# Patient Record
Sex: FEMALE | Race: WHITE | Employment: OTHER | URBAN - METROPOLITAN AREA
[De-identification: names, ages, dates, MRNs, and addresses within clinical notes are randomized per-mention and may not be internally consistent; named-entity substitution may affect disease eponyms.]

---

## 2018-10-11 ENCOUNTER — HOSPITAL ENCOUNTER (EMERGENCY)
Age: 83
Discharge: HOME | End: 2018-10-11
Attending: EMERGENCY MEDICINE
Payer: MEDICARE

## 2018-10-11 VITALS
BODY MASS INDEX: 28.47 KG/M2 | RESPIRATION RATE: 16 BRPM | HEIGHT: 60 IN | SYSTOLIC BLOOD PRESSURE: 190 MMHG | OXYGEN SATURATION: 90 % | HEART RATE: 72 BPM | DIASTOLIC BLOOD PRESSURE: 81 MMHG | TEMPERATURE: 98.4 F | WEIGHT: 145 LBS

## 2018-10-11 DIAGNOSIS — N30.01 ACUTE CYSTITIS WITH HEMATURIA: Primary | ICD-10-CM

## 2018-10-11 LAB
BACTERIA: ABNORMAL /HPF
BILIRUBIN URINE: NEGATIVE
BLOOD, URINE: ABNORMAL
CLARITY: ABNORMAL
COLOR: YELLOW
GLUCOSE URINE: NEGATIVE MG/DL
KETONES, URINE: NEGATIVE MG/DL
LEUKOCYTE ESTERASE, URINE: ABNORMAL
NITRITE, URINE: NEGATIVE
PH UA: 6 (ref 5–9)
PROTEIN UA: ABNORMAL MG/DL
RBC UA: ABNORMAL /HPF (ref 0–2)
SPECIFIC GRAVITY UA: 1.01 (ref 1–1.03)
UROBILINOGEN, URINE: 0.2 E.U./DL
WBC UA: >20 /HPF (ref 0–5)

## 2018-10-11 PROCEDURE — 87088 URINE BACTERIA CULTURE: CPT

## 2018-10-11 PROCEDURE — 99283 EMERGENCY DEPT VISIT LOW MDM: CPT

## 2018-10-11 PROCEDURE — 81001 URINALYSIS AUTO W/SCOPE: CPT

## 2018-10-11 PROCEDURE — 6370000000 HC RX 637 (ALT 250 FOR IP): Performed by: EMERGENCY MEDICINE

## 2018-10-11 RX ORDER — CEFDINIR 300 MG/1
300 CAPSULE ORAL ONCE
Status: COMPLETED | OUTPATIENT
Start: 2018-10-11 | End: 2018-10-11

## 2018-10-11 RX ORDER — CEFDINIR 300 MG/1
300 CAPSULE ORAL DAILY
Qty: 7 CAPSULE | Refills: 0 | Status: SHIPPED | OUTPATIENT
Start: 2018-10-11 | End: 2018-10-18

## 2018-10-11 RX ADMIN — CEFDINIR 300 MG: 300 CAPSULE ORAL at 03:00

## 2018-10-11 NOTE — ED PROVIDER NOTES
No chest wall tenderness, no crepitus. GI:  Abdomen Soft, Non tender, Non distended. No rebound, guarding, or rigidity. No organomegaly/masses. No peritoneal signs. Normal bowel sounds. No CVA tenderness  Musculoskeletal: Moves all extremities x 4. Warm and well perfused, no clubbing, cyanosis, or edema. Integument: skin warm and dry. No rashes. Neurologic: CN 2-12 grossly intact, no meningeal signs, no focal deficits  Psychiatric: Normal Affect, no signs of depression or psychosis      -------------------------------------------------- RESULTS -------------------------------------------------  I have personally reviewed all laboratory and imaging results for this patient. Results are listed below. LABS:  Results for orders placed or performed during the hospital encounter of 10/11/18   Urinalysis   Result Value Ref Range    Color, UA Yellow Straw/Yellow    Clarity, UA SL CLOUDY Clear    Glucose, Ur Negative Negative mg/dL    Bilirubin Urine Negative Negative    Ketones, Urine Negative Negative mg/dL    Specific Gravity, UA 1.010 1.005 - 1.030    Blood, Urine LARGE (A) Negative    pH, UA 6.0 5.0 - 9.0    Protein, UA TRACE Negative mg/dL    Urobilinogen, Urine 0.2 <2.0 E.U./dL    Nitrite, Urine Negative Negative    Leukocyte Esterase, Urine LARGE (A) Negative   Microscopic Urinalysis   Result Value Ref Range    WBC, UA >20 0 - 5 /HPF    RBC, UA 5-10 (A) 0 - 2 /HPF    Bacteria, UA FEW (A) /HPF       RADIOLOGY:  Interpreted by Radiologist.  No orders to display       ------------------------- NURSING NOTES AND VITALS REVIEWED ---------------------------   The nursing notes within the ED encounter and vital signs as below have been reviewed by myself.   BP (!) 175/88   Pulse 82   Temp 98 °F (36.7 °C) (Oral)   Resp 16   Ht 5' (1.524 m)   Wt 145 lb (65.8 kg)   SpO2 98%   BMI 28.32 kg/m²   Oxygen Saturation Interpretation: Normal    The patients available past medical records and past encounters were

## 2018-10-11 NOTE — ED NOTES
Discharge instructions given and reviewed with patient. RX given. Instructed to follow up with Dr Benito Montesinos. Questions and concerns addressed. Pt departed ED ambulatory in no apparent distress. Personal belongings taken.      Masood Crain RN  10/11/18 4321

## 2018-10-12 LAB — URINE CULTURE, ROUTINE: NORMAL

## 2019-06-05 ENCOUNTER — HOSPITAL ENCOUNTER (OUTPATIENT)
Dept: GENERAL RADIOLOGY | Age: 84
Discharge: HOME | End: 2019-06-07
Payer: MEDICARE

## 2019-06-05 ENCOUNTER — HOSPITAL ENCOUNTER (OUTPATIENT)
Age: 84
Discharge: HOME | End: 2019-06-07
Payer: MEDICARE

## 2019-06-05 DIAGNOSIS — M54.5 LOW BACK PAIN, UNSPECIFIED BACK PAIN LATERALITY, UNSPECIFIED CHRONICITY, WITH SCIATICA PRESENCE UNSPECIFIED: ICD-10-CM

## 2019-06-05 PROCEDURE — 72110 X-RAY EXAM L-2 SPINE 4/>VWS: CPT

## 2020-01-15 ENCOUNTER — HOSPITAL ENCOUNTER (OUTPATIENT)
Age: 85
Discharge: HOME | End: 2020-01-17

## 2020-01-15 PROCEDURE — 88342 IMHCHEM/IMCYTCHM 1ST ANTB: CPT

## 2020-01-15 PROCEDURE — 88305 TISSUE EXAM BY PATHOLOGIST: CPT

## 2021-04-26 ENCOUNTER — HOSPITAL ENCOUNTER (OUTPATIENT)
Dept: GENERAL RADIOLOGY | Age: 86
Discharge: HOME | End: 2021-04-28
Payer: MEDICARE

## 2021-04-26 ENCOUNTER — HOSPITAL ENCOUNTER (OUTPATIENT)
Age: 86
Discharge: HOME | End: 2021-04-28
Payer: MEDICARE

## 2021-04-26 DIAGNOSIS — M54.50 LOW BACK PAIN, UNSPECIFIED BACK PAIN LATERALITY, UNSPECIFIED CHRONICITY, UNSPECIFIED WHETHER SCIATICA PRESENT: ICD-10-CM

## 2021-04-26 PROCEDURE — 72110 X-RAY EXAM L-2 SPINE 4/>VWS: CPT

## 2022-03-25 ENCOUNTER — HOSPITAL ENCOUNTER (OUTPATIENT)
Dept: GENERAL RADIOLOGY | Age: 87
Discharge: HOME | End: 2022-03-27
Payer: MEDICARE

## 2022-03-25 ENCOUNTER — HOSPITAL ENCOUNTER (OUTPATIENT)
Age: 87
Discharge: HOME | End: 2022-03-27
Payer: MEDICARE

## 2022-03-25 DIAGNOSIS — S40.011A CONTUSION OF RIGHT SCAPULA, INITIAL ENCOUNTER: ICD-10-CM

## 2022-03-25 PROCEDURE — 73030 X-RAY EXAM OF SHOULDER: CPT

## 2022-05-10 ENCOUNTER — HOSPITAL ENCOUNTER (OUTPATIENT)
Age: 87
Setting detail: OBSERVATION
Discharge: HOME | End: 2022-05-12
Attending: EMERGENCY MEDICINE | Admitting: INTERNAL MEDICINE
Payer: MEDICARE

## 2022-05-10 ENCOUNTER — APPOINTMENT (OUTPATIENT)
Dept: GENERAL RADIOLOGY | Age: 87
End: 2022-05-10
Payer: MEDICARE

## 2022-05-10 ENCOUNTER — APPOINTMENT (OUTPATIENT)
Dept: CT IMAGING | Age: 87
End: 2022-05-10
Payer: MEDICARE

## 2022-05-10 DIAGNOSIS — E87.6 HYPOKALEMIA: ICD-10-CM

## 2022-05-10 DIAGNOSIS — R55 SYNCOPE AND COLLAPSE: Primary | ICD-10-CM

## 2022-05-10 DIAGNOSIS — E87.1 HYPONATREMIA: ICD-10-CM

## 2022-05-10 DIAGNOSIS — K57.90 DIVERTICULOSIS: ICD-10-CM

## 2022-05-10 PROBLEM — E78.5 HLD (HYPERLIPIDEMIA): Status: ACTIVE | Noted: 2022-05-10

## 2022-05-10 LAB
ALBUMIN SERPL-MCNC: 4.1 G/DL (ref 3.5–5.2)
ALP BLD-CCNC: 81 U/L (ref 35–104)
ALT SERPL-CCNC: 16 U/L (ref 0–32)
ANION GAP SERPL CALCULATED.3IONS-SCNC: 10 MMOL/L (ref 7–16)
AST SERPL-CCNC: 23 U/L (ref 0–31)
BACTERIA: ABNORMAL /HPF
BASOPHILS ABSOLUTE: 0.02 E9/L (ref 0–0.2)
BASOPHILS RELATIVE PERCENT: 0.3 % (ref 0–2)
BILIRUB SERPL-MCNC: 0.6 MG/DL (ref 0–1.2)
BILIRUBIN URINE: ABNORMAL
BLOOD, URINE: ABNORMAL
BUN BLDV-MCNC: 9 MG/DL (ref 6–23)
CALCIUM SERPL-MCNC: 9 MG/DL (ref 8.6–10.2)
CHLORIDE BLD-SCNC: 88 MMOL/L (ref 98–107)
CLARITY: CLEAR
CO2: 29 MMOL/L (ref 22–29)
COLOR: YELLOW
CREAT SERPL-MCNC: 0.6 MG/DL (ref 0.5–1)
EOSINOPHILS ABSOLUTE: 0.04 E9/L (ref 0.05–0.5)
EOSINOPHILS RELATIVE PERCENT: 0.6 % (ref 0–6)
EPITHELIAL CELLS, UA: ABNORMAL /HPF
GFR AFRICAN AMERICAN: >60
GFR NON-AFRICAN AMERICAN: >60 ML/MIN/1.73
GLUCOSE BLD-MCNC: 108 MG/DL (ref 74–99)
GLUCOSE URINE: NEGATIVE MG/DL
HCT VFR BLD CALC: 39.8 % (ref 34–48)
HEMOGLOBIN: 13.7 G/DL (ref 11.5–15.5)
IMMATURE GRANULOCYTES #: 0.03 E9/L
IMMATURE GRANULOCYTES %: 0.4 % (ref 0–5)
INFLUENZA A BY PCR: NOT DETECTED
INFLUENZA B BY PCR: NOT DETECTED
KETONES, URINE: 40 MG/DL
LACTIC ACID: 0.9 MMOL/L (ref 0.5–2.2)
LEUKOCYTE ESTERASE, URINE: ABNORMAL
LYMPHOCYTES ABSOLUTE: 0.99 E9/L (ref 1.5–4)
LYMPHOCYTES RELATIVE PERCENT: 14.2 % (ref 20–42)
MAGNESIUM: 1.9 MG/DL (ref 1.6–2.6)
MCH RBC QN AUTO: 30.1 PG (ref 26–35)
MCHC RBC AUTO-ENTMCNC: 34.4 % (ref 32–34.5)
MCV RBC AUTO: 87.5 FL (ref 80–99.9)
MONOCYTES ABSOLUTE: 0.68 E9/L (ref 0.1–0.95)
MONOCYTES RELATIVE PERCENT: 9.8 % (ref 2–12)
NEUTROPHILS ABSOLUTE: 5.2 E9/L (ref 1.8–7.3)
NEUTROPHILS RELATIVE PERCENT: 74.7 % (ref 43–80)
NITRITE, URINE: NEGATIVE
PDW BLD-RTO: 12.7 FL (ref 11.5–15)
PH UA: 6.5 (ref 5–9)
PLATELET # BLD: 211 E9/L (ref 130–450)
PMV BLD AUTO: 9.1 FL (ref 7–12)
POTASSIUM REFLEX MAGNESIUM: 3.1 MMOL/L (ref 3.5–5)
PRO-BNP: 321 PG/ML (ref 0–450)
PROTEIN UA: NEGATIVE MG/DL
RBC # BLD: 4.55 E12/L (ref 3.5–5.5)
RBC UA: ABNORMAL /HPF (ref 0–2)
SARS-COV-2, NAAT: NOT DETECTED
SODIUM BLD-SCNC: 127 MMOL/L (ref 132–146)
SPECIFIC GRAVITY UA: 1.01 (ref 1–1.03)
TOTAL PROTEIN: 6.9 G/DL (ref 6.4–8.3)
TROPONIN, HIGH SENSITIVITY: 10 NG/L (ref 0–9)
TROPONIN, HIGH SENSITIVITY: 10 NG/L (ref 0–9)
TROPONIN, HIGH SENSITIVITY: 8 NG/L (ref 0–9)
UROBILINOGEN, URINE: 0.2 E.U./DL
WBC # BLD: 7 E9/L (ref 4.5–11.5)
WBC UA: ABNORMAL /HPF (ref 0–5)

## 2022-05-10 PROCEDURE — 87088 URINE BACTERIA CULTURE: CPT

## 2022-05-10 PROCEDURE — 96360 HYDRATION IV INFUSION INIT: CPT

## 2022-05-10 PROCEDURE — 87635 SARS-COV-2 COVID-19 AMP PRB: CPT

## 2022-05-10 PROCEDURE — 6360000004 HC RX CONTRAST MEDICATION: Performed by: RADIOLOGY

## 2022-05-10 PROCEDURE — 96361 HYDRATE IV INFUSION ADD-ON: CPT

## 2022-05-10 PROCEDURE — 6360000002 HC RX W HCPCS: Performed by: NURSE PRACTITIONER

## 2022-05-10 PROCEDURE — 83880 ASSAY OF NATRIURETIC PEPTIDE: CPT

## 2022-05-10 PROCEDURE — 36415 COLL VENOUS BLD VENIPUNCTURE: CPT

## 2022-05-10 PROCEDURE — 2580000003 HC RX 258: Performed by: RADIOLOGY

## 2022-05-10 PROCEDURE — 6370000000 HC RX 637 (ALT 250 FOR IP): Performed by: EMERGENCY MEDICINE

## 2022-05-10 PROCEDURE — G0378 HOSPITAL OBSERVATION PER HR: HCPCS

## 2022-05-10 PROCEDURE — 2580000003 HC RX 258: Performed by: NURSE PRACTITIONER

## 2022-05-10 PROCEDURE — 71045 X-RAY EXAM CHEST 1 VIEW: CPT

## 2022-05-10 PROCEDURE — 99285 EMERGENCY DEPT VISIT HI MDM: CPT

## 2022-05-10 PROCEDURE — 96372 THER/PROPH/DIAG INJ SC/IM: CPT

## 2022-05-10 PROCEDURE — 83605 ASSAY OF LACTIC ACID: CPT

## 2022-05-10 PROCEDURE — 81001 URINALYSIS AUTO W/SCOPE: CPT

## 2022-05-10 PROCEDURE — 80053 COMPREHEN METABOLIC PANEL: CPT

## 2022-05-10 PROCEDURE — 74177 CT ABD & PELVIS W/CONTRAST: CPT

## 2022-05-10 PROCEDURE — 84484 ASSAY OF TROPONIN QUANT: CPT

## 2022-05-10 PROCEDURE — 70450 CT HEAD/BRAIN W/O DYE: CPT

## 2022-05-10 PROCEDURE — 83735 ASSAY OF MAGNESIUM: CPT

## 2022-05-10 PROCEDURE — 85025 COMPLETE CBC W/AUTO DIFF WBC: CPT

## 2022-05-10 PROCEDURE — 93005 ELECTROCARDIOGRAM TRACING: CPT | Performed by: EMERGENCY MEDICINE

## 2022-05-10 PROCEDURE — 87502 INFLUENZA DNA AMP PROBE: CPT

## 2022-05-10 RX ORDER — SODIUM CHLORIDE 0.9 % (FLUSH) 0.9 %
5-40 SYRINGE (ML) INJECTION PRN
Status: DISCONTINUED | OUTPATIENT
Start: 2022-05-10 | End: 2022-05-12 | Stop reason: HOSPADM

## 2022-05-10 RX ORDER — ACETAMINOPHEN 325 MG/1
650 TABLET ORAL EVERY 6 HOURS PRN
Status: DISCONTINUED | OUTPATIENT
Start: 2022-05-10 | End: 2022-05-12 | Stop reason: HOSPADM

## 2022-05-10 RX ORDER — LOSARTAN POTASSIUM 50 MG/1
50 TABLET ORAL NIGHTLY
COMMUNITY
Start: 2022-03-01

## 2022-05-10 RX ORDER — ENOXAPARIN SODIUM 100 MG/ML
40 INJECTION SUBCUTANEOUS DAILY
Status: DISCONTINUED | OUTPATIENT
Start: 2022-05-10 | End: 2022-05-12 | Stop reason: HOSPADM

## 2022-05-10 RX ORDER — SODIUM CHLORIDE 0.9 % (FLUSH) 0.9 %
5-40 SYRINGE (ML) INJECTION EVERY 12 HOURS SCHEDULED
Status: DISCONTINUED | OUTPATIENT
Start: 2022-05-10 | End: 2022-05-12 | Stop reason: HOSPADM

## 2022-05-10 RX ORDER — SODIUM CHLORIDE 0.9 % (FLUSH) 0.9 %
10 SYRINGE (ML) INJECTION PRN
Status: COMPLETED | OUTPATIENT
Start: 2022-05-10 | End: 2022-05-10

## 2022-05-10 RX ORDER — SODIUM CHLORIDE 9 MG/ML
INJECTION, SOLUTION INTRAVENOUS CONTINUOUS
Status: DISCONTINUED | OUTPATIENT
Start: 2022-05-10 | End: 2022-05-12 | Stop reason: HOSPADM

## 2022-05-10 RX ORDER — POTASSIUM CHLORIDE 750 MG/1
10 TABLET, EXTENDED RELEASE ORAL DAILY
Status: DISCONTINUED | OUTPATIENT
Start: 2022-05-11 | End: 2022-05-12 | Stop reason: HOSPADM

## 2022-05-10 RX ORDER — PANTOPRAZOLE SODIUM 40 MG/1
20 TABLET, DELAYED RELEASE ORAL DAILY
COMMUNITY
Start: 2022-04-11

## 2022-05-10 RX ORDER — SIMVASTATIN 40 MG
40 TABLET ORAL NIGHTLY
COMMUNITY
Start: 2022-04-11

## 2022-05-10 RX ORDER — SODIUM CHLORIDE 9 MG/ML
INJECTION, SOLUTION INTRAVENOUS PRN
Status: DISCONTINUED | OUTPATIENT
Start: 2022-05-10 | End: 2022-05-12 | Stop reason: HOSPADM

## 2022-05-10 RX ORDER — ACETAMINOPHEN 650 MG/1
650 SUPPOSITORY RECTAL EVERY 6 HOURS PRN
Status: DISCONTINUED | OUTPATIENT
Start: 2022-05-10 | End: 2022-05-12 | Stop reason: HOSPADM

## 2022-05-10 RX ADMIN — ENOXAPARIN SODIUM 40 MG: 100 INJECTION SUBCUTANEOUS at 21:18

## 2022-05-10 RX ADMIN — POTASSIUM BICARBONATE 40 MEQ: 782 TABLET, EFFERVESCENT ORAL at 18:30

## 2022-05-10 RX ADMIN — SODIUM CHLORIDE: 9 INJECTION, SOLUTION INTRAVENOUS at 21:18

## 2022-05-10 RX ADMIN — SODIUM CHLORIDE, PRESERVATIVE FREE 10 ML: 5 INJECTION INTRAVENOUS at 16:48

## 2022-05-10 RX ADMIN — IOPAMIDOL 75 ML: 755 INJECTION, SOLUTION INTRAVENOUS at 16:52

## 2022-05-10 ASSESSMENT — ENCOUNTER SYMPTOMS
SINUS PAIN: 0
NAUSEA: 1
EYE PAIN: 0
DIARRHEA: 0
BACK PAIN: 0
SORE THROAT: 0
ABDOMINAL PAIN: 0
SHORTNESS OF BREATH: 0
VOMITING: 0

## 2022-05-10 NOTE — ED PROVIDER NOTES
Chief Complaint   Patient presents with    Loss of Consciousness    Constipation     x 3 days     Fatigue       70-year-old female past medical history of hypertension and hyperlipidemia presents after 3 days of nausea, fatigue and one episode of syncope this morning. On Saturday she had an episode of diarrhea that was normal colored, there was no hematochezia or melena. Since then she has been incredibly nauseous but not vomiting and has not had an appetite, she says that water has been making her more nauseous as well so she has been having poor p.o. intake since Saturday. Denies fevers, chills, cough, shortness of breath. She has not had a bowel movement since Saturday and has never had surgeries on her abdomen. She is not having difficulties with urination and there is no associated numbness, weakness, tingling. She was in her kitchen this morning when she said she felt like there was a sickness coming over her, she became very cold and clammy and the next thing she remembers is waking up on the floor. That never happened to her before. She is never had a heart attack or stroke in the past and is on any blood thinners. Review of Systems   Constitutional: Positive for fatigue. Negative for chills and fever. HENT: Negative for ear pain, sinus pain and sore throat. Eyes: Negative for pain. Respiratory: Negative for shortness of breath. Cardiovascular: Negative for chest pain. Gastrointestinal: Positive for nausea. Negative for abdominal pain, diarrhea and vomiting. Genitourinary: Negative for flank pain and pelvic pain. Musculoskeletal: Negative for back pain and neck pain. Skin: Negative for rash. Neurological: Positive for syncope. Negative for seizures and headaches. Hematological: Negative for adenopathy. All other systems reviewed and are negative. Physical Exam  Vitals and nursing note reviewed.    Constitutional:       General: She is not in acute distress. Appearance: She is well-developed. She is not toxic-appearing. HENT:      Head: Normocephalic and atraumatic. Right Ear: External ear normal.      Left Ear: External ear normal.      Nose: Nose normal.      Mouth/Throat:      Mouth: Mucous membranes are moist.      Pharynx: Oropharynx is clear. Eyes:      Pupils: Pupils are equal, round, and reactive to light. Cardiovascular:      Rate and Rhythm: Normal rate and regular rhythm. Heart sounds: Murmur heard. Comments: Systolic murmur  Pulmonary:      Effort: Pulmonary effort is normal. No respiratory distress. Breath sounds: Normal breath sounds. No wheezing. Abdominal:      General: Bowel sounds are normal.      Palpations: Abdomen is soft. Tenderness: There is no abdominal tenderness. There is no guarding or rebound. Musculoskeletal:         General: No swelling. Cervical back: Normal range of motion and neck supple. Right lower leg: No edema. Left lower leg: No edema. Skin:     General: Skin is warm and dry. Neurological:      Mental Status: She is alert and oriented to person, place, and time. Cranial Nerves: No cranial nerve deficit. Motor: No weakness. Procedures     EKG: This EKG is signed and interpreted by me. Rate: 66  Rhythm: Sinus  Interpretation: no acute changes, no ST elevations, intervals normal,   Comparison: no previous EKG      MDM  Number of Diagnoses or Management Options  Diverticulosis  Hypokalemia  Hyponatremia  Syncope and collapse  Diagnosis management comments: 81-year-old female past medical history of hypertension hyperlipidemia plan after 3 days of nausea, fatigue and 1 episode of syncope this morning. She was hypertensive on arrival to emergency department otherwise hemodynamically stable. Dr. Bob Doan called earlier in the day to the patient evaluated for syncope in addition to small bowel obstruction.   EKG was unremarkable with no acute ST elevations or other changes, lab work demonstrated no leukocytosis, anemia, CMP showed hypokalemia and hyponatremia, no evidence of kidney dysfunction. Potassium was repleted. Sodium will be monitored. Chest x-ray and head CT not demonstrate acute process and CT abdomen pelvis showed diverticulosis with questionable mild uncomplicated diverticulitis. Deferring antibiotic management and is not clinically necessary. With her syncopal episode and minimal prior cardiac evaluation, she will need to be admitted for further cardiac work-up. Spoke with April, admitting under Dr. France Stanley, they will accept the patient for further evaluation. Patient verbalized understanding of the plan. ED Course as of 05/10/22 2030   Tue May 10, 2022   1546 Discussed results of labs and imaging with patient and she verbalizes understanding, and as well as plan for admission for further cardiac evaluation due to her syncopal episode. Replating potassium. [MM]   1852 Spoke with April and they will accept the patient for further management under Dr. France Stanley. [MM]      ED Course User Index  [MM] Ruth Pelletier DO        ED Course as of 05/10/22 2030   Tue May 10, 2022   8375 Discussed results of labs and imaging with patient and she verbalizes understanding, and as well as plan for admission for further cardiac evaluation due to her syncopal episode. Replating potassium. [MM]   1852 Spoke with April and they will accept the patient for further management under Dr. France Stanley. [MM]      ED Course User Index  [MM] Ruth Pelletier DO       --------------------------------------------- PAST HISTORY ---------------------------------------------  Past Medical History:  has a past medical history of Arthritis, Hyperlipidemia, and Hypertension. Past Surgical History:  has a past surgical history that includes Tonsillectomy. Social History:  reports that she has never smoked. She does not have any smokeless tobacco history on file.  She reports that she does not drink alcohol and does not use drugs. Family History: family history is not on file. The patients home medications have been reviewed. Allergies: Patient has no known allergies.     -------------------------------------------------- RESULTS -------------------------------------------------    LABS:  Results for orders placed or performed during the hospital encounter of 05/10/22   COVID-19, Rapid    Specimen: Nasopharyngeal Swab   Result Value Ref Range    SARS-CoV-2, NAAT Not Detected Not Detected   RAPID INFLUENZA A/B ANTIGENS    Specimen: Nasopharyngeal   Result Value Ref Range    Influenza A by PCR Not Detected Not Detected    Influenza B by PCR Not Detected Not Detected   CBC with Auto Differential   Result Value Ref Range    WBC 7.0 4.5 - 11.5 E9/L    RBC 4.55 3.50 - 5.50 E12/L    Hemoglobin 13.7 11.5 - 15.5 g/dL    Hematocrit 39.8 34.0 - 48.0 %    MCV 87.5 80.0 - 99.9 fL    MCH 30.1 26.0 - 35.0 pg    MCHC 34.4 32.0 - 34.5 %    RDW 12.7 11.5 - 15.0 fL    Platelets 720 637 - 603 E9/L    MPV 9.1 7.0 - 12.0 fL    Neutrophils % 74.7 43.0 - 80.0 %    Immature Granulocytes % 0.4 0.0 - 5.0 %    Lymphocytes % 14.2 (L) 20.0 - 42.0 %    Monocytes % 9.8 2.0 - 12.0 %    Eosinophils % 0.6 0.0 - 6.0 %    Basophils % 0.3 0.0 - 2.0 %    Neutrophils Absolute 5.20 1.80 - 7.30 E9/L    Immature Granulocytes # 0.03 E9/L    Lymphocytes Absolute 0.99 (L) 1.50 - 4.00 E9/L    Monocytes Absolute 0.68 0.10 - 0.95 E9/L    Eosinophils Absolute 0.04 (L) 0.05 - 0.50 E9/L    Basophils Absolute 0.02 0.00 - 0.20 E9/L   Comprehensive Metabolic Panel w/ Reflex to MG   Result Value Ref Range    Sodium 127 (L) 132 - 146 mmol/L    Potassium reflex Magnesium 3.1 (L) 3.5 - 5.0 mmol/L    Chloride 88 (L) 98 - 107 mmol/L    CO2 29 22 - 29 mmol/L    Anion Gap 10 7 - 16 mmol/L    Glucose 108 (H) 74 - 99 mg/dL    BUN 9 6 - 23 mg/dL    CREATININE 0.6 0.5 - 1.0 mg/dL    GFR Non-African American >60 >=60 mL/min/1.73    GFR African American >60     Calcium 9.0 8.6 - 10.2 mg/dL    Total Protein 6.9 6.4 - 8.3 g/dL    Albumin 4.1 3.5 - 5.2 g/dL    Total Bilirubin 0.6 0.0 - 1.2 mg/dL    Alkaline Phosphatase 81 35 - 104 U/L    ALT 16 0 - 32 U/L    AST 23 0 - 31 U/L   Troponin   Result Value Ref Range    Troponin, High Sensitivity 10 (H) 0 - 9 ng/L   Brain Natriuretic Peptide   Result Value Ref Range    Pro- 0 - 450 pg/mL   Urinalysis with Microscopic   Result Value Ref Range    Color, UA Yellow Straw/Yellow    Clarity, UA Clear Clear    Glucose, Ur Negative Negative mg/dL    Bilirubin Urine SMALL (A) Negative    Ketones, Urine 40 (A) Negative mg/dL    Specific Gravity, UA 1.010 1.005 - 1.030    Blood, Urine SMALL (A) Negative    pH, UA 6.5 5.0 - 9.0    Protein, UA Negative Negative mg/dL    Urobilinogen, Urine 0.2 <2.0 E.U./dL    Nitrite, Urine Negative Negative    Leukocyte Esterase, Urine SMALL (A) Negative    WBC, UA 5-10 (A) 0 - 5 /HPF    RBC, UA 2-5 0 - 2 /HPF    Epithelial Cells, UA FEW /HPF    Bacteria, UA NONE SEEN None Seen /HPF   Lactic Acid   Result Value Ref Range    Lactic Acid 0.9 0.5 - 2.2 mmol/L   Magnesium   Result Value Ref Range    Magnesium 1.9 1.6 - 2.6 mg/dL   Troponin   Result Value Ref Range    Troponin, High Sensitivity 8 0 - 9 ng/L   EKG 12 Lead   Result Value Ref Range    Ventricular Rate 66 BPM    Atrial Rate 66 BPM    P-R Interval 180 ms    QRS Duration 84 ms    Q-T Interval 442 ms    QTc Calculation (Bazett) 463 ms    P Axis 89 degrees    R Axis 7 degrees    T Axis 49 degrees       RADIOLOGY:  CT Head WO Contrast   Final Result   No acute intracranial abnormality. CT ABDOMEN PELVIS W IV CONTRAST Additional Contrast? None   Final Result   Diverticulosis of the left hemicolon with mural thickening of the sigmoid   colon likely spasm or mild uncomplicated diverticulitis. There is no bowel   obstruction. Ventral hernia.       Large hiatal hernia      RECOMMENDATIONS:   Unavailable         XR CHEST PORTABLE   Final Result   No acute process. ------------------------- NURSING NOTES AND VITALS REVIEWED ---------------------------  Date / Time Roomed:  5/10/2022 12:57 PM  ED Bed Assignment:  [id number]    The nursing notes within the ED encounter and vital signs as below have been reviewed. Patient Vitals for the past 24 hrs:   BP Temp Temp src Pulse Resp SpO2 Height Weight   05/10/22 1941 (!) 170/91 97.8 °F (36.6 °C) Oral 72 18 98 %     05/10/22 1912 (!) 145/77 98 °F (36.7 °C) Oral 65 15 97 %     05/10/22 1423 (!) 189/74   67 16 99 %     05/10/22 1247 (!) 189/82 97.8 °F (36.6 °C)  64 16 97 % 5' (1.524 m) 137 lb (62.1 kg)       Oxygen Saturation Interpretation: Normal    ------------------------------------------ PROGRESS NOTES ------------------------------------------  Re-evaluation(s):  Time: 1032  Patients symptoms show no change  Repeat physical examination is not changed    Counseling:  I have spoken with the patient and discussed todays results, in addition to providing specific details for the plan of care and counseling regarding the diagnosis and prognosis. Their questions are answered at this time and they are agreeable with the plan of admission.    --------------------------------- ADDITIONAL PROVIDER NOTES ---------------------------------  Consultations:  Time: 1852. Spoke April, admitting for with Dr. Ardella Dom. Discussed case. They will admit the patient. This patient's ED course included: a personal history and physicial examination, re-evaluation prior to disposition, multiple bedside re-evaluations, cardiac monitoring, continuous pulse oximetry and complex medical decision making and emergency management    This patient has remained hemodynamically stable during their ED course. Diagnosis:  1. Syncope and collapse    2. Hypokalemia    3. Hyponatremia    4.  Diverticulosis        Disposition:  Patient's disposition: Admit to telemetry  Patient's condition is stable.            Eddie Morales,   Resident  05/10/22 2030

## 2022-05-11 PROBLEM — R55 SYNCOPE AND COLLAPSE: Status: ACTIVE | Noted: 2022-05-11

## 2022-05-11 LAB
ANION GAP SERPL CALCULATED.3IONS-SCNC: 7 MMOL/L (ref 7–16)
BASOPHILS ABSOLUTE: 0.01 E9/L (ref 0–0.2)
BASOPHILS RELATIVE PERCENT: 0.2 % (ref 0–2)
BUN BLDV-MCNC: 10 MG/DL (ref 6–23)
CALCIUM SERPL-MCNC: 8.6 MG/DL (ref 8.6–10.2)
CHLORIDE BLD-SCNC: 93 MMOL/L (ref 98–107)
CO2: 29 MMOL/L (ref 22–29)
CREAT SERPL-MCNC: 0.6 MG/DL (ref 0.5–1)
EKG ATRIAL RATE: 66 BPM
EKG P AXIS: 89 DEGREES
EKG P-R INTERVAL: 180 MS
EKG Q-T INTERVAL: 442 MS
EKG QRS DURATION: 84 MS
EKG QTC CALCULATION (BAZETT): 463 MS
EKG R AXIS: 7 DEGREES
EKG T AXIS: 49 DEGREES
EKG VENTRICULAR RATE: 66 BPM
EOSINOPHILS ABSOLUTE: 0.06 E9/L (ref 0.05–0.5)
EOSINOPHILS RELATIVE PERCENT: 1.3 % (ref 0–6)
GFR AFRICAN AMERICAN: >60
GFR NON-AFRICAN AMERICAN: >60 ML/MIN/1.73
GLUCOSE BLD-MCNC: 100 MG/DL (ref 74–99)
HCT VFR BLD CALC: 39.3 % (ref 34–48)
HEMOGLOBIN: 13.7 G/DL (ref 11.5–15.5)
IMMATURE GRANULOCYTES #: 0.02 E9/L
IMMATURE GRANULOCYTES %: 0.4 % (ref 0–5)
LV EF: 60 %
LVEF MODALITY: NORMAL
LYMPHOCYTES ABSOLUTE: 1 E9/L (ref 1.5–4)
LYMPHOCYTES RELATIVE PERCENT: 20.9 % (ref 20–42)
MCH RBC QN AUTO: 30.7 PG (ref 26–35)
MCHC RBC AUTO-ENTMCNC: 34.9 % (ref 32–34.5)
MCV RBC AUTO: 88.1 FL (ref 80–99.9)
MONOCYTES ABSOLUTE: 0.63 E9/L (ref 0.1–0.95)
MONOCYTES RELATIVE PERCENT: 13.2 % (ref 2–12)
NEUTROPHILS ABSOLUTE: 3.06 E9/L (ref 1.8–7.3)
NEUTROPHILS RELATIVE PERCENT: 64 % (ref 43–80)
PDW BLD-RTO: 12.9 FL (ref 11.5–15)
PLATELET # BLD: 201 E9/L (ref 130–450)
PMV BLD AUTO: 9.2 FL (ref 7–12)
POTASSIUM REFLEX MAGNESIUM: 3.6 MMOL/L (ref 3.5–5)
RBC # BLD: 4.46 E12/L (ref 3.5–5.5)
SODIUM BLD-SCNC: 129 MMOL/L (ref 132–146)
WBC # BLD: 4.8 E9/L (ref 4.5–11.5)

## 2022-05-11 PROCEDURE — 89055 LEUKOCYTE ASSESSMENT FECAL: CPT

## 2022-05-11 PROCEDURE — 6360000002 HC RX W HCPCS: Performed by: NURSE PRACTITIONER

## 2022-05-11 PROCEDURE — 93306 TTE W/DOPPLER COMPLETE: CPT

## 2022-05-11 PROCEDURE — G0378 HOSPITAL OBSERVATION PER HR: HCPCS

## 2022-05-11 PROCEDURE — 80048 BASIC METABOLIC PNL TOTAL CA: CPT

## 2022-05-11 PROCEDURE — 97165 OT EVAL LOW COMPLEX 30 MIN: CPT

## 2022-05-11 PROCEDURE — 87425 ROTAVIRUS AG IA: CPT

## 2022-05-11 PROCEDURE — 93010 ELECTROCARDIOGRAM REPORT: CPT | Performed by: INTERNAL MEDICINE

## 2022-05-11 PROCEDURE — 96372 THER/PROPH/DIAG INJ SC/IM: CPT

## 2022-05-11 PROCEDURE — 87077 CULTURE AEROBIC IDENTIFY: CPT

## 2022-05-11 PROCEDURE — 6370000000 HC RX 637 (ALT 250 FOR IP): Performed by: NURSE PRACTITIONER

## 2022-05-11 PROCEDURE — 87329 GIARDIA AG IA: CPT

## 2022-05-11 PROCEDURE — 87045 FECES CULTURE AEROBIC BACT: CPT

## 2022-05-11 PROCEDURE — 96361 HYDRATE IV INFUSION ADD-ON: CPT

## 2022-05-11 PROCEDURE — 2580000003 HC RX 258: Performed by: NURSE PRACTITIONER

## 2022-05-11 PROCEDURE — 97161 PT EVAL LOW COMPLEX 20 MIN: CPT

## 2022-05-11 PROCEDURE — 36415 COLL VENOUS BLD VENIPUNCTURE: CPT

## 2022-05-11 PROCEDURE — 85025 COMPLETE CBC W/AUTO DIFF WBC: CPT

## 2022-05-11 RX ORDER — PANTOPRAZOLE SODIUM 20 MG/1
20 TABLET, DELAYED RELEASE ORAL DAILY
Status: DISCONTINUED | OUTPATIENT
Start: 2022-05-11 | End: 2022-05-12 | Stop reason: HOSPADM

## 2022-05-11 RX ORDER — ATORVASTATIN CALCIUM 20 MG/1
20 TABLET, FILM COATED ORAL NIGHTLY
Status: DISCONTINUED | OUTPATIENT
Start: 2022-05-11 | End: 2022-05-12 | Stop reason: HOSPADM

## 2022-05-11 RX ORDER — LOSARTAN POTASSIUM 50 MG/1
50 TABLET ORAL NIGHTLY
Status: DISCONTINUED | OUTPATIENT
Start: 2022-05-11 | End: 2022-05-12 | Stop reason: HOSPADM

## 2022-05-11 RX ADMIN — ATORVASTATIN CALCIUM 20 MG: 20 TABLET, FILM COATED ORAL at 20:03

## 2022-05-11 RX ADMIN — ENOXAPARIN SODIUM 40 MG: 100 INJECTION SUBCUTANEOUS at 20:02

## 2022-05-11 RX ADMIN — LOSARTAN POTASSIUM 50 MG: 50 TABLET, FILM COATED ORAL at 20:03

## 2022-05-11 RX ADMIN — BISACODYL 5 MG: 5 TABLET, COATED ORAL at 07:55

## 2022-05-11 RX ADMIN — PANTOPRAZOLE SODIUM 20 MG: 20 TABLET, DELAYED RELEASE ORAL at 14:37

## 2022-05-11 RX ADMIN — SODIUM CHLORIDE: 9 INJECTION, SOLUTION INTRAVENOUS at 14:38

## 2022-05-11 RX ADMIN — POTASSIUM CHLORIDE 10 MEQ: 750 TABLET, EXTENDED RELEASE ORAL at 07:55

## 2022-05-11 NOTE — PROGRESS NOTES
Physical Therapy  Facility/Department: [address]  Physical Therapy Initial Assessment    Name: Shane Segovia  : 1935  MRN: [id number]  Date of Service: 2022      Patient Diagnosis(es): The primary encounter diagnosis was Syncope and collapse. Diagnoses of Hypokalemia, Hyponatremia, and Diverticulosis were also pertinent to this visit. Past Medical History:  has a past medical history of Arthritis, Hyperlipidemia, and Hypertension. Past Surgical History:  has a past surgical history that includes Tonsillectomy. Referring provider:  Alana Price MD    PT Order:  PT eval and treat     Evaluating PT:  Alyssa Britton PT, DPT PT [id number]    Room #:  [facility]  Diagnosis:  Syncope and collapse [R55]  Hypokalemia [E87.6]  Diverticulosis [K57.90]  Hyponatremia [E87.1]  Syncope, unspecified syncope type [R55]  Precautions:  Fall risk  Equipment Needs:  None. Pt reported owning a cane. SUBJECTIVE:    Pt lives alone in a 1 story home with 1 stairs to enter and 1 rail. Bed and bath is on first floor. Pt ambulated with no device but uses a cane as needed PTA. OBJECTIVE:   Initial Evaluation  Date:  Treatment Short Term/ Long Term   Goals   Was pt agreeable to Eval/treatment? yes     Does pt have pain? None reported     Bed Mobility  Rolling: independent  Supine to sit: independent  Sit to supine: independent  Scooting: independent  independent   Transfers Sit to stand: Supervision   Stand to sit: supervision   Stand pivot: NT  Modified independent   Ambulation    60 feet with SPC CG/SBA   200+ feet with AAD if needed modified independent    Stair negotiation: ascended and descended  NT   4 steps with 1 rail modified independent   ROM BLE:  WFL     Strength BLE:  Grossly 4/5  Grossly 4+/5   Balance Sitting EOB:  independent  Dynamic Standing:  CG/SBA with SPC  Sitting EOB:  independent  Dynamic Standing:  Modified independent with SPC   AM-PAC 6 Clicks 47/45       Pt is A & O x 3.   Pt hard of hearing  Sensation:  Pt denies numbness and tingling to extremities    Patient education  Pt educated on PT objectives during eval and while in the hospital, calling for assistance. Patient response to education:   Pt verbalized understanding Pt demonstrated skill Pt requires further education in this area   Yes    yes     ASSESSMENT:    Conditions Requiring Skilled Therapeutic Intervention:    [x]Decreased strength     []Decreased ROM  [x]Decreased functional mobility  [x]Decreased balance   [x]Decreased endurance   []Decreased posture  []Decreased sensation  []Decreased coordination   []Decreased vision  []Decreased safety awareness   []Increased pain       Comments:  Pt found in bed. No report of dizziness during functional mobility. Pt drifting to the left at one point during ambulation with CGA to correct. At end of eval, pt left in bed with call light in reach and bed alarm on.      Pt's/ family goals   1. None stated    Prognosis is good for reaching above PT goals. Patient and or family understand(s) diagnosis, prognosis, and plan of care.   yes    PHYSICAL THERAPY PLAN OF CARE:    PT POC is established based on physician order and patient diagnosis     Diagnosis:  Syncope and collapse [R55]  Hypokalemia [E87.6]  Diverticulosis [K57.90]  Hyponatremia [E87.1]  Syncope, unspecified syncope type [R55]    Current Treatment Recommendations:     [x] Strengthening to improve independence with functional mobility   [] ROM to improve independence with functional mobility   [x] Balance Training to improve static/dynamic balance and to reduce fall risk  [x] Endurance Training to improve activity tolerance during functional mobility   [x] Transfer Training to improve safety and independence with all functional transfers   [x] Gait Training to improve gait mechanics, endurance and assess need for appropriate assistive device  [x] Stair Training in preparation for safe discharge home and/or into the community [] Positioning to prevent skin breakdown and contractures  [x] Safety and Education Training   [x] Patient/Caregiver Education   [] HEP  [] Other     PT long term treatment goals are located in above grid    Frequency of treatments: 2-5x/week x 1-2 weeks. Time in  1407  Time out  1416    Evaluation Time includes thorough review of current medical information, gathering information on past medical history/social history and prior level of function, completion of standardized testing/informal observation of tasks, assessment of data and education on plan of care and goals.     CPT codes:  [x] Low Complexity PT evaluation 41453  [] Moderate Complexity PT evaluation 07211  [] High Complexity PT evaluation 48379  [] PT Re-evaluation 21085  [] Therapeutic activities 09273 __minutes  [] Therapeutic exercises 01837 __ minutes      Trudy Todd, PT, DPT  PT [id number]

## 2022-05-11 NOTE — CARE COORDINATION
Social Work/Discharge Planning:  Social Work consult noted. Met with patient and her daughter Livier Alegre and completed initial assessment. Explained Social Work role and discussed transition of care/discharge planning. COVID negative 5/10. Patient lives in a one story house with one step to enter. PTA patient uses a cane as needed. Patient has a bedside commode at home. She denies any history with hhc or snf. Patient PCP is Dr. Aaron Green and pharmacy is [facility] on [address]. Discussed option for home health care and she denies any need for hhc at this time. Informed patient to contact her PCP if hhc is needed. Will continue to follow and assist with discharge planning.   Electronically signed by TONJA Rodriguez on 5/11/2022 at 3:56 PM

## 2022-05-11 NOTE — PROGRESS NOTES
Occupational Therapy  OCCUPATIONAL THERAPY INITIAL EVALUATION     Aida Yung [facility], [place]        [facility]:                                                  Patient Name: Henri Telles    MRN: [id number]    : 1935    Room: 5688077-      Evaluating OT: Karl Bragg OTR/L [id number]      Referring Provider: ELEN Wilson CNP    Specific Provider Orders/Date: OT eval and treat 5/10/22      Diagnosis: Syncope and collapse [R55]  Hypokalemia [E87.6]  Diverticulosis [K57.90]  Hyponatremia [E87.1]  Syncope, unspecified syncope type [R55]      Pertinent Medical History: arthritis, HTN      Precautions:  Fall Risk     Assessment of current deficits    [x] Functional mobility  [x]ADLs  [x] Strength               []Cognition    [x] Functional transfers   [x] IADLs         [x] Safety Awareness   [x]Endurance    [] Fine Coordination              [x] Balance      [] Vision/perception   []Sensation     []Gross Motor Coordination  [] ROM  [] Delirium                   [] Motor Control     OT PLAN OF CARE   OT POC based on physician orders, patient diagnosis and results of clinical assessment    Frequency/Duration 2-4 days/wk for 2 weeks PRN   Specific OT Treatment Interventions to include:   * Instruction/training on adapted ADL techniques and AE recommendations to increase functional independence within precautions       * Training on energy conservation strategies, correct breathing pattern and techniques to improve independence/tolerance for self-care routine  * Functional transfer/mobility training/DME recommendations for increased independence, safety, and fall prevention  * Patient/Family education to increase follow through with safety techniques and functional independence  * Recommendation of environmental modifications for increased safety with functional transfers/mobility and ADLs  * Therapeutic exercise to improve with ADLs and functional task completion   Activity Tolerance Fair+ with light activity  Good with ADL activity. Pt will demonstrate good understanding of education provided on EC/WS techniques    Visual/  Perceptual Glasses: at bedside                Additional long-term goal: Pt will increase functional independence to PLOF to allow pt to live in least restrictive environment. Hand Dominance R   AROM (PROM) Strength Additional Info:    RUE  WFL WFL good  and wfl FMC/dexterity noted during ADL tasks and functional bed mobility       LUE WFL WFL good  and wfl FMC/dexterity noted during ADL tasks and functional bed mobility       Hearing: WFL   Sensation:  No c/o numbness or tingling   Tone: WFL   Edema: none noted    Comments: Upon arrival patient lying in bed. At end of session, patient returned to bed with call light and phone within reach, all lines and tubes intact. Overall patient demonstrated decreased independence and safety during completion of ADL/functional transfer/mobility tasks. Pt would benefit from continued skilled OT to increase safety and independence with completion of ADL/IADL tasks for functional independence and quality of life. Rehab Potential: Good for established goals     Patient / Family Goal: none stated    Patient and/or family were instructed on functional diagnosis, prognosis/goals and OT plan of care. Demonstrated good understanding.      Eval Complexity: Low    Time In: 1406  Time Out: 1419    Min Units   OT Eval Low 97165  x  1   OT Eval Medium 20075      OT Eval High 50136      OT Re-Eval [id number]       Therapeutic Ex [id number]       Therapeutic Activities 61955       ADL/Self Care 72242       Orthotic Management 34119       Manual 44827     Neuro Re-Ed 79112       Non-Billable Time          Evaluation Time additionally includes thorough review of current medical information, gathering information on past medical history/social history and prior level of function, interpretation of standardized testing/informal observation of tasks, assessment of data and development of plan of care and goals.             Emily Cabrera, OTR/L, [id number]

## 2022-05-11 NOTE — H&P
[facility]  History and Physical      CHIEF COMPLAINT:    Chief Complaint   Patient presents with    Loss of Consciousness    Constipation     x 3 days     Fatigue        Patient of Nguyen Avendano MD presents with:  Syncope    History of Present Illness:   Patient is an 71-year-old female with a past medical history of arthritis, HLD, HTN who presents to the emergency room after a syncopal episode. Patient states that she was in her kitchen when all of a sudden she felt a sensation of clamminess and loss of her peripheral vision and then next remembering waking up on the floor. Patient also states that she has had constipation for the past 4 days. Patient states recently she had an GI bug which resulted in her not having an appetite in which she did not eat or drink much for the past few days. Patient had CT head and CXR which were negative for anything acute. She did have a CT abdomen which identified a large hiatal hernia and an area of possible mild uncomplicated diverticulitis with no bowel obstruction. Her lab work did identify hyponatremia 127, hypokalemia with 3.4 and hypochloremia of 88. Patient denies any CP, SOB, abdominal pain, fever, chills, N/VD. Patient is admitted to intermediate telemetry unit for further work-up and treatment. REVIEW OF SYSTEMS:  Pertinent negatives are above in HPI. 10 point ROS otherwise negative. Past Medical History:   Diagnosis Date    Arthritis     Hyperlipidemia     Hypertension          Past Surgical History:   Procedure Laterality Date    TONSILLECTOMY         Medications Prior to Admission:    Medications Prior to Admission: losartan (COZAAR) 50 MG tablet, Take 50 mg by mouth at bedtime  pantoprazole (PROTONIX) 40 MG tablet, Take 20 mg by mouth daily  simvastatin (ZOCOR) 40 MG tablet, Take 40 mg by mouth at bedtime  potassium chloride SA (K-DUR;KLOR-CON) 10 MEQ tablet, Take 1 tablet by mouth daily.  (Patient taking differently: Take 20 mEq by mouth daily )  HYDROCHLOROTHIAZIDE, Take 25 mg by mouth daily Unknown dose   [DISCONTINUED] Simvastatin (ZOCOR PO), Take 1 tablet by mouth daily. Unknown dose     Note that the patient's home medications were reviewed and the above list is accurate to the best of my knowledge at the time of the exam.    Allergies:    Patient has no known allergies. Social History:    reports that she has never smoked. She does not have any smokeless tobacco history on file. She reports that she does not drink alcohol and does not use drugs. Family History:   family history is not on file. PHYSICAL EXAM:    Vitals:  BP (!) 171/71   Pulse 63   Temp 97.8 °F (36.6 °C) (Oral)   Resp 16   Ht 5' (1.524 m)   Wt 138 lb 1.6 oz (62.6 kg)   SpO2 98%   BMI 26.97 kg/m²       General appearance: NAD, conversant, pleasant  Eyes: Sclerae anicteric, PERRLA  HEENT: AT/NC, MMM  Neck: FROM, supple, no thyromegaly  Lymph: No cervical / supraclavicular lymphadenopathy  Lungs: Clear to auscultation, WOB normal  CV: RRR, no MRGs, no lower extremity edema  Abdomen: Soft, non-tender; no masses or HSM, +BS  Extremities: FROM without synovitis. No clubbing or cyanosis of the hands. Skin: no rash, induration, lesions, or ulcers  Psych: Calm and cooperative. Normal judgement and insight. Normal mood and affect. Neuro: Alert and interactive, face symmetric, speech fluent. LABS:  All labs reviewed.   Of note:  CBC:   Lab Results   Component Value Date    WBC 4.8 05/11/2022    RBC 4.46 05/11/2022    HGB 13.7 05/11/2022    HCT 39.3 05/11/2022    MCV 88.1 05/11/2022    MCH 30.7 05/11/2022    MCHC 34.9 05/11/2022    RDW 12.9 05/11/2022     05/11/2022    MPV 9.2 05/11/2022     CMP:    Lab Results   Component Value Date     05/11/2022    K 3.6 05/11/2022    CL 93 05/11/2022    CO2 29 05/11/2022    BUN 10 05/11/2022    CREATININE 0.6 05/11/2022    GFRAA >60 05/11/2022    LABGLOM >60 05/11/2022    GLUCOSE 100 05/11/2022 PROT 6.9 05/10/2022    LABALBU 4.1 05/10/2022    CALCIUM 8.6 05/11/2022    BILITOT 0.6 05/10/2022    ALKPHOS 81 05/10/2022    AST 23 05/10/2022    ALT 16 05/10/2022       Imaging:  CXR: Negative  CT head: Negative  CT abdomen pelvis: Large hiatal hernia, ventral hernia, diverticulosis of the left hemicolon with mild thickening of the sigmoid colon likely spasm or mild uncomplicated diverticulitis. EKG:  Sinus rhythm with PACs    Telemetry:  Sinus rhythm with PACs    ASSESSMENT/PLAN:  Principal Problem:    Syncope  Active Problems:    Hypokalemia    Diverticulosis    Hyponatremia    HLD (hyperlipidemia)  Resolved Problems:    * No resolved hospital problems. *    Patient is an 81-year-old female admitted to [facility] for  Syncope and collapse likely related to dehydration  -Monitor labs  -Check orthostatics  -IVF NS at 50  -Check echocardiogram normal left ventricular function    Electrolyte imbalance  -Monitor labs, BMP daily  -K+ 3.1 > 3.6 today after replacement  -Hyponatremia 127 > 129 , continue NS    Hypertension   -Continue home medications    DVT prophylaxis Lovenox. PT OT  Discharge Planning    Code status: Full  Requires inpatient level of care  ELEN Pugh CNP    2:06 PM  5/11/2022     Above note edited to reflect my thoughts   Continue to monitor through tonight, check orthostatics  Discharge plan tomorrow    I personally saw, examined and provided care for the patient. Radiographs, labs and medication list were reviewed by me independently. The case was discussed in detail and plans for care were established. Review of LUKE Pugh   , documentation was conducted and revisions were made as appropriate directly by me. I agree with the above documented exam, problem list, and plan of care.      Matt Luis MD  6:27 PM  5/11/2022

## 2022-05-12 VITALS
DIASTOLIC BLOOD PRESSURE: 69 MMHG | SYSTOLIC BLOOD PRESSURE: 155 MMHG | TEMPERATURE: 98 F | RESPIRATION RATE: 16 BRPM | BODY MASS INDEX: 27.48 KG/M2 | HEIGHT: 60 IN | OXYGEN SATURATION: 97 % | HEART RATE: 67 BPM | WEIGHT: 140 LBS

## 2022-05-12 LAB
ANION GAP SERPL CALCULATED.3IONS-SCNC: 8 MMOL/L (ref 7–16)
BUN BLDV-MCNC: 11 MG/DL (ref 6–23)
CALCIUM SERPL-MCNC: 8.6 MG/DL (ref 8.6–10.2)
CHLORIDE BLD-SCNC: 96 MMOL/L (ref 98–107)
CO2: 28 MMOL/L (ref 22–29)
CREAT SERPL-MCNC: 0.5 MG/DL (ref 0.5–1)
GFR AFRICAN AMERICAN: >60
GFR NON-AFRICAN AMERICAN: >60 ML/MIN/1.73
GIARDIA ANTIGEN STOOL: NORMAL
GLUCOSE BLD-MCNC: 135 MG/DL (ref 74–99)
POTASSIUM SERPL-SCNC: 4.6 MMOL/L (ref 3.5–5)
ROTAVIRUS ANTIGEN: NORMAL
SODIUM BLD-SCNC: 132 MMOL/L (ref 132–146)
WHITE BLOOD CELLS (WBC), STOOL: NORMAL

## 2022-05-12 PROCEDURE — 2580000003 HC RX 258: Performed by: NURSE PRACTITIONER

## 2022-05-12 PROCEDURE — 36415 COLL VENOUS BLD VENIPUNCTURE: CPT

## 2022-05-12 PROCEDURE — 80048 BASIC METABOLIC PNL TOTAL CA: CPT

## 2022-05-12 PROCEDURE — G0378 HOSPITAL OBSERVATION PER HR: HCPCS

## 2022-05-12 PROCEDURE — 6370000000 HC RX 637 (ALT 250 FOR IP): Performed by: NURSE PRACTITIONER

## 2022-05-12 PROCEDURE — 96361 HYDRATE IV INFUSION ADD-ON: CPT

## 2022-05-12 RX ADMIN — POTASSIUM CHLORIDE 10 MEQ: 750 TABLET, EXTENDED RELEASE ORAL at 08:51

## 2022-05-12 RX ADMIN — PANTOPRAZOLE SODIUM 20 MG: 20 TABLET, DELAYED RELEASE ORAL at 08:51

## 2022-05-12 RX ADMIN — SODIUM CHLORIDE, PRESERVATIVE FREE 10 ML: 5 INJECTION INTRAVENOUS at 08:50

## 2022-05-12 RX ADMIN — ACETAMINOPHEN 650 MG: 325 TABLET ORAL at 05:55

## 2022-05-12 ASSESSMENT — PAIN SCALES - GENERAL: PAINLEVEL_OUTOF10: 0

## 2022-05-12 NOTE — PROGRESS NOTES
Pt accidentally removed IV, refusing new IV, states she was told she could leave today and does not need one

## 2022-05-12 NOTE — DISCHARGE SUMMARY
[facility]   Discharge summary   Patient ID:  Erin Pedroza  [id number]  81 y.o.  1935    Admit date: 5/10/2022    Discharge date and time: 5/12/2022    Admission Diagnoses:   Patient Active Problem List   Diagnosis    Syncope    Hypokalemia    Diverticulosis    Hyponatremia    HLD (hyperlipidemia)    Syncope and collapse       Discharge Diagnoses: Syncope and collapse     Consults: none    Procedures: none    Hospital Course:     Patient is an 81-year-old female admitted to [facility] for  Syncope and collapse likely related to dehydration  -Monitor labs  -Check orthostatics  -IVF NS at 50 > discontinued   -Check echocardiogram- normal left ventricular function     Electrolyte imbalance  -Monitor labs, BMP daily  -K+ 3.1 > 3.6 today after replacement  -Hyponatremia 127 > 129 , continue NS > 132 on discharge      Hypertension   -Continue home medications    Recent Labs     05/10/22  1441 05/11/22  0528   WBC 7.0 4.8   HGB 13.7 13.7   HCT 39.8 39.3    201       Recent Labs     05/10/22  1441 05/11/22  0528 05/12/22  1042   * 129* 132   K 3.1* 3.6 4.6   CL 88* 93* 96*   CO2 29 29 28   BUN 9 10 11   CREATININE 0.6 0.6 0.5   CALCIUM 9.0 8.6 8.6       Echo Complete    Result Date: 5/11/2022  Transthoracic Echocardiography Report (TTE)  Demographics   Patient Name    [facility]       Gender            Female                  [facility] Record  [id number]      Room Number       2733  Number   Account #       [de-identified]     Procedure Date    05/11/2022   Corporate ID                  Alex Murrieta MD                                Physician   Accession       [id number]    Referring  Number                        Physician   Date of Birth   1935    Sonographer       RAHAT Ramirez   Age             81 year(s)    Interpreting      [address] [place] [address]                                Physician         Physician Cardiology Raeann Cornell MD                                 Any Other  Procedure Type of Study   TTE procedure:Echo Complete W/Doppler & Color Flow. Procedure Date Date: 05/11/2022 Start: 09:18 AM Study Location: Echo Lab Technical Quality: Adequate visualization Indications:Syncope. Patient Status: Routine Height: 60 inches Weight: 138 pounds BSA: 1.59 m^2 BMI: 26.95 kg/m^2 HR: 63 bpm BP: 147/88 mmHg  Findings   Left Ventricle  Left ventricular internal dimensions were normal in diastole and systole. Mild asymmetric proximal septal hypertrophy. No regional wall motion abnormalities seen. Normal left ventricular ejection fraction. Ejection fraction is visually estimated at 60%. Indeterminate diastolic function. Right Ventricle  Normal right ventricular size and function. Left Atrium  The left atrium is mildly dilated. Interatrial septum appears intact. Right Atrium  Normal right atrium size. Mitral Valve  Focal calcification mitral valve leaflets. Mild mitral annular calcification. Mild mitral regurgitation is present. Tricuspid Valve  The tricuspid valve appears structurally normal.  Physiologic and/or trace tricuspid regurgitation. Aortic Valve  The aortic valve appears mildly sclerotic. Mild aortic regurgitation is noted. Pulmonic Valve  The pulmonic valve was not well visualized. Pericardial Effusion  No evidence of pericardial effusion. Aorta  Aortic root dimension within normal limits. Conclusions   Summary  Left ventricular internal dimensions were normal in diastole and systole. Mild asymmetric proximal septal hypertrophy. No regional wall motion abnormalities seen. Normal left ventricular ejection fraction. The left atrium is mildly dilated. Focal calcification mitral valve leaflets. Mild mitral annular calcification. Mild mitral regurgitation is present. The aortic valve appears mildly sclerotic. Mild aortic regurgitation is noted.   Physiologic and/or trace tricuspid regurgitation. Signature   ----------------------------------------------------------------  Electronically signed by Rocio Watson MD(Interpreting  physician) on 05/11/2022 02:18 PM  ----------------------------------------------------------------  M-Mode/2D Measurements & Calculations   LV Diastolic    LV Systolic Dimension: 2.6   AV Cusp Separation: 1.4 cmLA  Dimension: 3.9  cm                           Dimension: 2.5 cmAO Root  cm              LV Volume Diastolic: 68.8 ml Dimension: 3 cm  LV FS:33.3 %    LV Volume Systolic: 92.1 ml  RVOT VTI: 16.8 cm  LV PW           LV EDV/LV EDV Index: 65.0  Diastolic: 1.1  LM/57 VW/C^6NM ESV/LV ESV  cm              Index: 25.7 ml/16ml/ m^2     RV Diastolic Dimension: 2.6  Septum          EF Calculated: 60.2 %        cm  Diastolic: 1.1  LV Mass Index: 88 l/min*m^2  cm                                           Ascending Aorta: 2.6 cm  CO: 4.39 l/min                               LA volume/Index: 62.9 ml  CI: 2.76        LVOT: 1.9 cm                 /39ml/m^2  l/m*m^2                                      RA Area: 10.2 cm^2  LV Mass: 140.09  g  Doppler Measurements & Calculations   MV Peak E-Wave:   AV Peak Velocity: 1.57 m/s    LVOT Peak Velocity: 1.08  0.82 m/s          AV Peak Gradient: 9.82 mmHg   m/s  MV Peak A-Wave:   AV Mean Velocity: 0.99 m/s    LVOT Mean Velocity: 0.68  1.08 m/s          AV Mean Gradient: 4.6 mmHg    m/s  MV E/A Ratio:     AV VTI: 32.5 cm               LVOT Peak Gradient: 4.7  0.77              AV Area (Continuity):2.15     mmHgLVOT Mean Gradient:  MV Peak Gradient: cm^2                          2.1 mmHg  6.2 mmHg          AV Deceleration Time: 3241.1  Estimated RVSP: 31.8 mmHg  MV Mean Gradient: msec                          Estimated RAP:3 mmHg  2.3 mmHg          LVOT VTI: 24.6 cm  MV Mean Velocity:  0.72 m/s          Estimated PASP: 27.58 mmHg    TR Velocity:2.48 m/s  MV Deceleration   Pulm.  Vein A Reversal         TR Gradient:24.58 mmHg  Time: 228.6 msec  Duration:147.6 msec           PV Peak Velocity: 0.79 m/s  MV P1/2t: 49.7    Pulm. Vein D Velocity:0.35    PV Peak Gradient: 2.52  msec              m/sPulm. Vein A Reversal      mmHg  MVA by PHT:4.43   Velocity:0.3 m/s              PV Mean Velocity: 0.48 m/s  cm^2              Pulm. Vein S Velocity: 0.74   PV Mean Gradient: 1.1 mmHg  MV Area           m/s  (continuity): 2.2  cm^2  MV E' Septal  Velocity: 0.04  m/s  MV E' Lateral  Velocity: 4 m/s  [email or web address] [id number]    CT Head WO Contrast    Result Date: 5/10/2022  EXAMINATION: CT OF THE HEAD WITHOUT CONTRAST  5/10/2022 4:48 pm TECHNIQUE: CT of the head was performed without the administration of intravenous contrast. Automated exposure control, iterative reconstruction, and/or weight based adjustment of the mA/kV was utilized to reduce the radiation dose to as low as reasonably achievable. COMPARISON: None. HISTORY: ORDERING SYSTEM PROVIDED HISTORY: syncope, unwitnessed fall TECHNOLOGIST PROVIDED HISTORY: Reason for exam:->syncope, unwitnessed fall Has a \"code stroke\" or \"stroke alert\" been called? ->No Decision Support Exception - unselect if not a suspected or confirmed emergency medical condition->Emergency Medical Condition (MA) FINDINGS: BRAIN/VENTRICLES: There is no acute intracranial hemorrhage, mass effect or midline shift. No abnormal extra-axial fluid collection. The gray-white differentiation is maintained without evidence of an acute infarct. There is no evidence of hydrocephalus. There is moderate generalized atrophy and chronic ischemic/degenerative changes in the white matter. ORBITS: The visualized portion of the orbits demonstrate no acute abnormality. SINUSES: The visualized paranasal sinuses and mastoid air cells demonstrate no acute abnormality.   There is mucosal thickening in both maxillary sinuses, relatively significant on the right. Pole SOFT TISSUES/SKULL:  No acute abnormality of the visualized skull or soft tissues. No acute intracranial abnormality. CT ABDOMEN PELVIS W IV CONTRAST Additional Contrast? None    Result Date: 5/10/2022  EXAMINATION: CT OF THE ABDOMEN AND PELVIS WITH CONTRAST 5/10/2022 4:48 pm TECHNIQUE: CT of the abdomen and pelvis was performed with the administration of intravenous contrast. Multiplanar reformatted images are provided for review. Automated exposure control, iterative reconstruction, and/or weight based adjustment of the mA/kV was utilized to reduce the radiation dose to as low as reasonably achievable. COMPARISON: None. HISTORY: ORDERING SYSTEM PROVIDED HISTORY: r/o obstruction TECHNOLOGIST PROVIDED HISTORY: Additional Contrast?->None Reason for exam:->r/o obstruction Decision Support Exception - unselect if not a suspected or confirmed emergency medical condition->Emergency Medical Condition (MA) FINDINGS: The lung bases demonstrate a large hiatal hernia. There is coronary artery calcifications. The liver is normal.  Gallbladder is partially distended. Spleen, pancreas, the adrenals and the kidneys are normal.  There is vascular calcification. Multiple compression fractures of the thoracic and lumbar spine are noted. Fat containing umbilical hernia is noted. Stomach is collapsed with the  wall thickening. Pelvis. The bladder is normal.  Small bowel loops are normal.  There is diffuse diverticulosis of the left hemicolon with mural thickening of the sigmoid colon. The appendix is normal.     Diverticulosis of the left hemicolon with mural thickening of the sigmoid colon likely spasm or mild uncomplicated diverticulitis. There is no bowel obstruction. Ventral hernia. Large hiatal hernia RECOMMENDATIONS: Unavailable     XR CHEST PORTABLE    Result Date: 5/10/2022  EXAMINATION: ONE XRAY VIEW OF THE CHEST 5/10/2022 1:22 pm COMPARISON: None.  HISTORY: ORDERING SYSTEM PROVIDED HISTORY: syncope TECHNOLOGIST PROVIDED HISTORY: Reason for exam:->syncope FINDINGS: The lungs are without acute focal process. There is no effusion or pneumothorax. The cardiomediastinal silhouette is without acute process. The osseous structures are without acute process. There is a retrocardiac density with an air-fluid level, suggests a fixed hiatus hernia. No acute process. Discharge Exam:    HEENT: NCAT,  PERRLA, No JVD  Heart:  RRR, no murmurs, gallops, or rubs. Lungs:  CTA bilaterally, no wheeze, rales or rhonchi  Abd: bowel sounds present, nontender, nondistended, no masses  Extrem:  No clubbing, cyanosis, or edema    Disposition: home     Patient Condition at Discharge: Stable    Patient Instructions:      Medication List        CHANGE how you take these medications      potassium chloride 10 MEQ extended release tablet  Commonly known as: KLOR-CON M  Take 1 tablet by mouth daily. What changed: how much to take            CONTINUE taking these medications      HYDROCHLOROTHIAZIDE     losartan 50 MG tablet  Commonly known as: COZAAR     pantoprazole 40 MG tablet  Commonly known as: PROTONIX     simvastatin 40 MG tablet  Commonly known as: ZOCOR            Activity: activity as tolerated  Diet: regular diet    Pt has been advised to: Follow-up with Janeth Singh MD in 1 week. Follow-up with consultants as recommended by them    Note that over 30 minutes was spent in preparing discharge papers, discussing discharge with patient, medication review, etc.    Signed:  Ena Councilman, APRN - CNP  5/12/2022  1:13 PM    Above note edited to reflect my thoughts     I personally saw, examined and provided care for the patient. Radiographs, labs and medication list were reviewed by me independently. The case was discussed in detail and plans for care were established. Review of Ena Councilman, APRN-CNP   , documentation was conducted and revisions were made as appropriate directly by me. I agree with the above documented exam, problem list, and plan of care.      Nasima Nolasco MD  5/12/2022

## 2022-05-12 NOTE — CARE COORDINATION
CASE MANAGEMENT. ... Discharge order noted on chart. Met with patient at the bedside and confirmed no needs.

## 2022-05-13 LAB — URINE CULTURE, ROUTINE: NORMAL

## 2022-05-15 LAB — CULTURE, STOOL: NORMAL

## 2023-07-13 ENCOUNTER — APPOINTMENT (OUTPATIENT)
Dept: CT IMAGING | Age: 88
DRG: 640 | End: 2023-07-13
Payer: MEDICARE

## 2023-07-13 ENCOUNTER — APPOINTMENT (OUTPATIENT)
Dept: GENERAL RADIOLOGY | Age: 88
DRG: 640 | End: 2023-07-13
Payer: MEDICARE

## 2023-07-13 ENCOUNTER — HOSPITAL ENCOUNTER (INPATIENT)
Age: 88
LOS: 5 days | Discharge: HOME | DRG: 640 | End: 2023-07-18
Attending: EMERGENCY MEDICINE | Admitting: INTERNAL MEDICINE
Payer: MEDICARE

## 2023-07-13 DIAGNOSIS — E87.1 HYPONATREMIA: ICD-10-CM

## 2023-07-13 DIAGNOSIS — R52 GENERALIZED BODY ACHES: ICD-10-CM

## 2023-07-13 DIAGNOSIS — R11.2 NAUSEA AND VOMITING, UNSPECIFIED VOMITING TYPE: Primary | ICD-10-CM

## 2023-07-13 LAB
ALBUMIN SERPL-MCNC: 3.3 G/DL (ref 3.5–5.2)
ALP SERPL-CCNC: 109 U/L (ref 35–104)
ALT SERPL-CCNC: 52 U/L (ref 0–32)
ANION GAP SERPL CALCULATED.3IONS-SCNC: 11 MMOL/L (ref 7–16)
AST SERPL-CCNC: 72 U/L (ref 0–31)
B PARAP IS1001 DNA NPH QL NAA+NON-PROBE: NOT DETECTED
B PERT.PT PRMT NPH QL NAA+NON-PROBE: NOT DETECTED
BASOPHILS # BLD: 0 E9/L (ref 0–0.2)
BASOPHILS NFR BLD: 0 % (ref 0–2)
BILIRUB SERPL-MCNC: 0.7 MG/DL (ref 0–1.2)
BUN SERPL-MCNC: 13 MG/DL (ref 6–23)
C PNEUM DNA NPH QL NAA+NON-PROBE: NOT DETECTED
CALCIUM SERPL-MCNC: 8.6 MG/DL (ref 8.6–10.2)
CHLORIDE SERPL-SCNC: 86 MMOL/L (ref 98–107)
CO2 SERPL-SCNC: 27 MMOL/L (ref 22–29)
CREAT SERPL-MCNC: 0.6 MG/DL (ref 0.5–1)
EOSINOPHIL # BLD: 0.03 E9/L (ref 0.05–0.5)
EOSINOPHIL NFR BLD: 1 % (ref 0–6)
ERYTHROCYTE [DISTWIDTH] IN BLOOD BY AUTOMATED COUNT: 12.9 FL (ref 11.5–15)
FLUAV RNA NPH QL NAA+NON-PROBE: NOT DETECTED
FLUBV RNA NPH QL NAA+NON-PROBE: NOT DETECTED
GLUCOSE SERPL-MCNC: 111 MG/DL (ref 74–99)
HADV DNA NPH QL NAA+NON-PROBE: NOT DETECTED
HCOV 229E RNA NPH QL NAA+NON-PROBE: NOT DETECTED
HCOV HKU1 RNA NPH QL NAA+NON-PROBE: NOT DETECTED
HCOV NL63 RNA NPH QL NAA+NON-PROBE: NOT DETECTED
HCOV OC43 RNA NPH QL NAA+NON-PROBE: NOT DETECTED
HCT VFR BLD AUTO: 36.6 % (ref 34–48)
HGB BLD-MCNC: 13.2 G/DL (ref 11.5–15.5)
HMPV RNA NPH QL NAA+NON-PROBE: NOT DETECTED
HPIV1 RNA NPH QL NAA+NON-PROBE: NOT DETECTED
HPIV2 RNA NPH QL NAA+NON-PROBE: NOT DETECTED
HPIV3 RNA NPH QL NAA+NON-PROBE: NOT DETECTED
HPIV4 RNA NPH QL NAA+NON-PROBE: NOT DETECTED
LACTATE BLDV-SCNC: 1 MMOL/L (ref 0.5–2.2)
LYMPHOCYTES # BLD: 0.18 E9/L (ref 1.5–4)
LYMPHOCYTES NFR BLD: 2 % (ref 20–42)
M PNEUMO DNA NPH QL NAA+NON-PROBE: NOT DETECTED
MAGNESIUM SERPL-MCNC: 1.7 MG/DL (ref 1.6–2.6)
MCH RBC QN AUTO: 30 PG (ref 26–35)
MCHC RBC AUTO-ENTMCNC: 36.1 % (ref 32–34.5)
MCV RBC AUTO: 83.2 FL (ref 80–99.9)
MONOCYTES # BLD: 0.18 E9/L (ref 0.1–0.95)
MONOCYTES NFR BLD: 6 % (ref 2–12)
NEUTROPHILS # BLD: 2.61 E9/L (ref 1.8–7.3)
NEUTS SEG NFR BLD: 87 % (ref 43–80)
OVALOCYTES: ABNORMAL
PLATELET # BLD AUTO: 87 E9/L (ref 130–450)
PLATELET CONFIRMATION: NORMAL
PMV BLD AUTO: 9.6 FL (ref 7–12)
POIKILOCYTES: ABNORMAL
POLYCHROMASIA: ABNORMAL
POTASSIUM SERPL-SCNC: 3.5 MMOL/L (ref 3.5–5)
PROT SERPL-MCNC: 5.7 G/DL (ref 6.4–8.3)
RBC # BLD AUTO: 4.4 E12/L (ref 3.5–5.5)
RSV RNA NPH QL NAA+NON-PROBE: NOT DETECTED
RV+EV RNA NPH QL NAA+NON-PROBE: NOT DETECTED
SARS-COV-2 RDRP RESP QL NAA+PROBE: NOT DETECTED
SARS-COV-2 RNA NPH QL NAA+NON-PROBE: NOT DETECTED
SODIUM SERPL-SCNC: 124 MMOL/L (ref 132–146)
TEAR DROP CELLS: ABNORMAL
VACUOLATED NEUTROPHILS: ABNORMAL
VARIANT LYMPHS NFR BLD: 4 % (ref 0–4)
WBC # BLD: 3 E9/L (ref 4.5–11.5)

## 2023-07-13 PROCEDURE — 0202U NFCT DS 22 TRGT SARS-COV-2: CPT

## 2023-07-13 PROCEDURE — 2580000003 HC RX 258: Performed by: NURSE PRACTITIONER

## 2023-07-13 PROCEDURE — 83605 ASSAY OF LACTIC ACID: CPT

## 2023-07-13 PROCEDURE — 71045 X-RAY EXAM CHEST 1 VIEW: CPT

## 2023-07-13 PROCEDURE — 2060000000 HC ICU INTERMEDIATE R&B

## 2023-07-13 PROCEDURE — 87088 URINE BACTERIA CULTURE: CPT

## 2023-07-13 PROCEDURE — 6370000000 HC RX 637 (ALT 250 FOR IP): Performed by: NURSE PRACTITIONER

## 2023-07-13 PROCEDURE — 83735 ASSAY OF MAGNESIUM: CPT

## 2023-07-13 PROCEDURE — 6360000002 HC RX W HCPCS

## 2023-07-13 PROCEDURE — 85025 COMPLETE CBC W/AUTO DIFF WBC: CPT

## 2023-07-13 PROCEDURE — 6360000002 HC RX W HCPCS: Performed by: NURSE PRACTITIONER

## 2023-07-13 PROCEDURE — 96374 THER/PROPH/DIAG INJ IV PUSH: CPT

## 2023-07-13 PROCEDURE — 99285 EMERGENCY DEPT VISIT HI MDM: CPT

## 2023-07-13 PROCEDURE — 74176 CT ABD & PELVIS W/O CONTRAST: CPT

## 2023-07-13 PROCEDURE — 2580000003 HC RX 258

## 2023-07-13 PROCEDURE — 87635 SARS-COV-2 COVID-19 AMP PRB: CPT

## 2023-07-13 PROCEDURE — 80053 COMPREHEN METABOLIC PANEL: CPT

## 2023-07-13 RX ORDER — PANTOPRAZOLE SODIUM 20 MG/1
20 TABLET, DELAYED RELEASE ORAL DAILY
Status: DISCONTINUED | OUTPATIENT
Start: 2023-07-13 | End: 2023-07-18 | Stop reason: HOSPADM

## 2023-07-13 RX ORDER — ACETAMINOPHEN 325 MG/1
650 TABLET ORAL EVERY 6 HOURS PRN
Status: DISCONTINUED | OUTPATIENT
Start: 2023-07-13 | End: 2023-07-18 | Stop reason: HOSPADM

## 2023-07-13 RX ORDER — PHENOL 1.4 %
1 AEROSOL, SPRAY (ML) MUCOUS MEMBRANE DAILY
COMMUNITY

## 2023-07-13 RX ORDER — HYDROCHLOROTHIAZIDE 25 MG/1
25 TABLET ORAL DAILY
Status: DISCONTINUED | OUTPATIENT
Start: 2023-07-13 | End: 2023-07-14

## 2023-07-13 RX ORDER — SODIUM CHLORIDE 9 MG/ML
INJECTION, SOLUTION INTRAVENOUS CONTINUOUS
Status: DISCONTINUED | OUTPATIENT
Start: 2023-07-13 | End: 2023-07-14

## 2023-07-13 RX ORDER — ONDANSETRON 2 MG/ML
4 INJECTION INTRAMUSCULAR; INTRAVENOUS ONCE
Status: COMPLETED | OUTPATIENT
Start: 2023-07-13 | End: 2023-07-13

## 2023-07-13 RX ORDER — SODIUM CHLORIDE 9 MG/ML
INJECTION, SOLUTION INTRAVENOUS PRN
Status: DISCONTINUED | OUTPATIENT
Start: 2023-07-13 | End: 2023-07-18 | Stop reason: HOSPADM

## 2023-07-13 RX ORDER — ACETAMINOPHEN 650 MG/1
650 SUPPOSITORY RECTAL EVERY 6 HOURS PRN
Status: DISCONTINUED | OUTPATIENT
Start: 2023-07-13 | End: 2023-07-18 | Stop reason: HOSPADM

## 2023-07-13 RX ORDER — ENOXAPARIN SODIUM 100 MG/ML
40 INJECTION SUBCUTANEOUS DAILY
Status: DISCONTINUED | OUTPATIENT
Start: 2023-07-13 | End: 2023-07-18 | Stop reason: HOSPADM

## 2023-07-13 RX ORDER — SODIUM CHLORIDE 0.9 % (FLUSH) 0.9 %
5-40 SYRINGE (ML) INJECTION EVERY 12 HOURS SCHEDULED
Status: DISCONTINUED | OUTPATIENT
Start: 2023-07-13 | End: 2023-07-18 | Stop reason: HOSPADM

## 2023-07-13 RX ORDER — MULTIVIT-MIN/IRON/FOLIC ACID/K 18-600-40
1000 CAPSULE ORAL
COMMUNITY

## 2023-07-13 RX ORDER — LOSARTAN POTASSIUM 50 MG/1
50 TABLET ORAL NIGHTLY
Status: DISCONTINUED | OUTPATIENT
Start: 2023-07-13 | End: 2023-07-18 | Stop reason: HOSPADM

## 2023-07-13 RX ORDER — POLYETHYLENE GLYCOL 3350 17 G/17G
17 POWDER, FOR SOLUTION ORAL DAILY PRN
Status: DISCONTINUED | OUTPATIENT
Start: 2023-07-13 | End: 2023-07-18 | Stop reason: HOSPADM

## 2023-07-13 RX ORDER — ATORVASTATIN CALCIUM 20 MG/1
20 TABLET, FILM COATED ORAL DAILY
Status: DISCONTINUED | OUTPATIENT
Start: 2023-07-13 | End: 2023-07-18 | Stop reason: HOSPADM

## 2023-07-13 RX ORDER — SODIUM CHLORIDE 0.9 % (FLUSH) 0.9 %
10 SYRINGE (ML) INJECTION PRN
Status: DISCONTINUED | OUTPATIENT
Start: 2023-07-13 | End: 2023-07-18 | Stop reason: HOSPADM

## 2023-07-13 RX ORDER — ONDANSETRON 4 MG/1
4 TABLET, ORALLY DISINTEGRATING ORAL EVERY 8 HOURS PRN
Status: DISCONTINUED | OUTPATIENT
Start: 2023-07-13 | End: 2023-07-18 | Stop reason: HOSPADM

## 2023-07-13 RX ORDER — 0.9 % SODIUM CHLORIDE 0.9 %
1000 INTRAVENOUS SOLUTION INTRAVENOUS ONCE
Status: COMPLETED | OUTPATIENT
Start: 2023-07-13 | End: 2023-07-13

## 2023-07-13 RX ORDER — ONDANSETRON 2 MG/ML
4 INJECTION INTRAMUSCULAR; INTRAVENOUS EVERY 6 HOURS PRN
Status: DISCONTINUED | OUTPATIENT
Start: 2023-07-13 | End: 2023-07-18 | Stop reason: HOSPADM

## 2023-07-13 RX ADMIN — PANTOPRAZOLE SODIUM 20 MG: 20 TABLET, DELAYED RELEASE ORAL at 17:14

## 2023-07-13 RX ADMIN — SODIUM CHLORIDE 1000 ML: 9 INJECTION, SOLUTION INTRAVENOUS at 09:04

## 2023-07-13 RX ADMIN — ENOXAPARIN SODIUM 40 MG: 100 INJECTION SUBCUTANEOUS at 14:30

## 2023-07-13 RX ADMIN — LOSARTAN POTASSIUM 50 MG: 50 TABLET, FILM COATED ORAL at 20:29

## 2023-07-13 RX ADMIN — SODIUM CHLORIDE: 9 INJECTION, SOLUTION INTRAVENOUS at 14:30

## 2023-07-13 RX ADMIN — ONDANSETRON 4 MG: 2 INJECTION INTRAMUSCULAR; INTRAVENOUS at 09:55

## 2023-07-13 ASSESSMENT — ENCOUNTER SYMPTOMS
DIARRHEA: 0
COUGH: 0
CONSTIPATION: 1
VOMITING: 0
SHORTNESS OF BREATH: 0
NAUSEA: 1

## 2023-07-13 ASSESSMENT — LIFESTYLE VARIABLES
HOW MANY STANDARD DRINKS CONTAINING ALCOHOL DO YOU HAVE ON A TYPICAL DAY: PATIENT DOES NOT DRINK
HOW OFTEN DO YOU HAVE A DRINK CONTAINING ALCOHOL: NEVER

## 2023-07-13 ASSESSMENT — PAIN - FUNCTIONAL ASSESSMENT: PAIN_FUNCTIONAL_ASSESSMENT: NONE - DENIES PAIN

## 2023-07-14 LAB
ANION GAP SERPL CALCULATED.3IONS-SCNC: 10 MMOL/L (ref 7–16)
BASOPHILS # BLD: 0 E9/L (ref 0–0.2)
BASOPHILS NFR BLD: 0 % (ref 0–2)
BUN SERPL-MCNC: 11 MG/DL (ref 6–23)
CALCIUM SERPL-MCNC: 7.6 MG/DL (ref 8.6–10.2)
CHLORIDE SERPL-SCNC: 95 MMOL/L (ref 98–107)
CO2 SERPL-SCNC: 22 MMOL/L (ref 22–29)
CREAT SERPL-MCNC: 0.6 MG/DL (ref 0.5–1)
EOSINOPHIL # BLD: 0.05 E9/L (ref 0.05–0.5)
EOSINOPHIL NFR BLD: 2 % (ref 0–6)
ERYTHROCYTE [DISTWIDTH] IN BLOOD BY AUTOMATED COUNT: 12.8 FL (ref 11.5–15)
GLUCOSE SERPL-MCNC: 89 MG/DL (ref 74–99)
HCT VFR BLD AUTO: 33.8 % (ref 34–48)
HGB BLD-MCNC: 12.2 G/DL (ref 11.5–15.5)
LYMPHOCYTES # BLD: 1.01 E9/L (ref 1.5–4)
LYMPHOCYTES NFR BLD: 35 % (ref 20–42)
MAGNESIUM SERPL-MCNC: 1.8 MG/DL (ref 1.6–2.6)
MCH RBC QN AUTO: 30 PG (ref 26–35)
MCHC RBC AUTO-ENTMCNC: 36.1 % (ref 32–34.5)
MCV RBC AUTO: 83.3 FL (ref 80–99.9)
MONOCYTES # BLD: 0.34 E9/L (ref 0.1–0.95)
MONOCYTES NFR BLD: 13 % (ref 2–12)
NEUTROPHILS # BLD: 1.2 E9/L (ref 1.8–7.3)
NEUTS SEG NFR BLD: 46 % (ref 43–80)
PLATELET # BLD AUTO: 85 E9/L (ref 130–450)
PLATELET CONFIRMATION: NORMAL
PMV BLD AUTO: 10.6 FL (ref 7–12)
POTASSIUM SERPL-SCNC: 3.1 MMOL/L (ref 3.5–5)
RBC # BLD AUTO: 4.06 E12/L (ref 3.5–5.5)
RBC MORPH BLD: NORMAL
SMUDGE CELLS: ABNORMAL
SODIUM SERPL-SCNC: 127 MMOL/L (ref 132–146)
VACUOLATED NEUTROPHILS: ABNORMAL
VARIANT LYMPHS NFR BLD: 4 % (ref 0–4)
WBC # BLD: 2.6 E9/L (ref 4.5–11.5)

## 2023-07-14 PROCEDURE — 36415 COLL VENOUS BLD VENIPUNCTURE: CPT

## 2023-07-14 PROCEDURE — 97165 OT EVAL LOW COMPLEX 30 MIN: CPT

## 2023-07-14 PROCEDURE — 6370000000 HC RX 637 (ALT 250 FOR IP): Performed by: NURSE PRACTITIONER

## 2023-07-14 PROCEDURE — 80048 BASIC METABOLIC PNL TOTAL CA: CPT

## 2023-07-14 PROCEDURE — 6360000002 HC RX W HCPCS: Performed by: INTERNAL MEDICINE

## 2023-07-14 PROCEDURE — 2060000000 HC ICU INTERMEDIATE R&B

## 2023-07-14 PROCEDURE — 6360000002 HC RX W HCPCS: Performed by: NURSE PRACTITIONER

## 2023-07-14 PROCEDURE — 85025 COMPLETE CBC W/AUTO DIFF WBC: CPT

## 2023-07-14 PROCEDURE — 2580000003 HC RX 258: Performed by: NURSE PRACTITIONER

## 2023-07-14 PROCEDURE — 83735 ASSAY OF MAGNESIUM: CPT

## 2023-07-14 PROCEDURE — 97535 SELF CARE MNGMENT TRAINING: CPT

## 2023-07-14 PROCEDURE — 6370000000 HC RX 637 (ALT 250 FOR IP): Performed by: INTERNAL MEDICINE

## 2023-07-14 PROCEDURE — 2500000003 HC RX 250 WO HCPCS: Performed by: INTERNAL MEDICINE

## 2023-07-14 RX ORDER — POTASSIUM CHLORIDE 20 MEQ/1
40 TABLET, EXTENDED RELEASE ORAL 2 TIMES DAILY WITH MEALS
Status: COMPLETED | OUTPATIENT
Start: 2023-07-14 | End: 2023-07-15

## 2023-07-14 RX ORDER — DEXTROSE, SODIUM CHLORIDE, AND POTASSIUM CHLORIDE 5; .9; .15 G/100ML; G/100ML; G/100ML
INJECTION INTRAVENOUS CONTINUOUS
Status: DISCONTINUED | OUTPATIENT
Start: 2023-07-14 | End: 2023-07-16

## 2023-07-14 RX ORDER — POTASSIUM CHLORIDE 20 MEQ/1
40 TABLET, EXTENDED RELEASE ORAL
Status: DISCONTINUED | OUTPATIENT
Start: 2023-07-14 | End: 2023-07-14

## 2023-07-14 RX ORDER — MAGNESIUM SULFATE IN WATER 40 MG/ML
2000 INJECTION, SOLUTION INTRAVENOUS ONCE
Status: COMPLETED | OUTPATIENT
Start: 2023-07-14 | End: 2023-07-14

## 2023-07-14 RX ADMIN — ONDANSETRON 4 MG: 2 INJECTION INTRAMUSCULAR; INTRAVENOUS at 05:35

## 2023-07-14 RX ADMIN — ONDANSETRON 4 MG: 2 INJECTION INTRAMUSCULAR; INTRAVENOUS at 21:37

## 2023-07-14 RX ADMIN — POTASSIUM CHLORIDE 40 MEQ: 1500 TABLET, EXTENDED RELEASE ORAL at 16:51

## 2023-07-14 RX ADMIN — SODIUM CHLORIDE: 9 INJECTION, SOLUTION INTRAVENOUS at 10:45

## 2023-07-14 RX ADMIN — PANTOPRAZOLE SODIUM 20 MG: 20 TABLET, DELAYED RELEASE ORAL at 08:40

## 2023-07-14 RX ADMIN — MAGNESIUM SULFATE HEPTAHYDRATE 2000 MG: 40 INJECTION, SOLUTION INTRAVENOUS at 10:44

## 2023-07-14 RX ADMIN — ACETAMINOPHEN 650 MG: 325 TABLET ORAL at 21:32

## 2023-07-14 RX ADMIN — POTASSIUM CHLORIDE, DEXTROSE MONOHYDRATE AND SODIUM CHLORIDE: 150; 5; 900 INJECTION, SOLUTION INTRAVENOUS at 14:18

## 2023-07-14 RX ADMIN — SODIUM CHLORIDE, PRESERVATIVE FREE 10 ML: 5 INJECTION INTRAVENOUS at 21:38

## 2023-07-14 RX ADMIN — SODIUM CHLORIDE, PRESERVATIVE FREE 10 ML: 5 INJECTION INTRAVENOUS at 08:40

## 2023-07-14 RX ADMIN — LOSARTAN POTASSIUM 50 MG: 50 TABLET, FILM COATED ORAL at 21:32

## 2023-07-14 RX ADMIN — ENOXAPARIN SODIUM 40 MG: 100 INJECTION SUBCUTANEOUS at 08:44

## 2023-07-15 LAB
ALBUMIN SERPL-MCNC: 2.9 G/DL (ref 3.5–5.2)
ALP SERPL-CCNC: 116 U/L (ref 35–104)
ALT SERPL-CCNC: 35 U/L (ref 0–32)
ANION GAP SERPL CALCULATED.3IONS-SCNC: 8 MMOL/L (ref 7–16)
AST SERPL-CCNC: 35 U/L (ref 0–31)
BACTERIA UR CULT: NORMAL
BILIRUB DIRECT SERPL-MCNC: <0.2 MG/DL (ref 0–0.3)
BILIRUB INDIRECT SERPL-MCNC: ABNORMAL MG/DL (ref 0–1)
BILIRUB SERPL-MCNC: 0.2 MG/DL (ref 0–1.2)
BUN SERPL-MCNC: 8 MG/DL (ref 6–23)
CALCIUM SERPL-MCNC: 7.6 MG/DL (ref 8.6–10.2)
CHLORIDE SERPL-SCNC: 100 MMOL/L (ref 98–107)
CO2 SERPL-SCNC: 22 MMOL/L (ref 22–29)
CREAT SERPL-MCNC: 0.5 MG/DL (ref 0.5–1)
GFR SERPL CREATININE-BSD FRML MDRD: >60 ML/MIN/1.73M2
GLUCOSE SERPL-MCNC: 134 MG/DL (ref 74–99)
IRON SERPL-MCNC: 42 UG/DL (ref 37–145)
POTASSIUM SERPL-SCNC: 4 MMOL/L (ref 3.5–5)
PROT SERPL-MCNC: 5 G/DL (ref 6.4–8.3)
SODIUM SERPL-SCNC: 130 MMOL/L (ref 132–146)
TSH SERPL DL<=0.05 MIU/L-ACNC: 2.29 UIU/ML (ref 0.27–4.2)

## 2023-07-15 PROCEDURE — 2500000003 HC RX 250 WO HCPCS: Performed by: INTERNAL MEDICINE

## 2023-07-15 PROCEDURE — 2580000003 HC RX 258: Performed by: NURSE PRACTITIONER

## 2023-07-15 PROCEDURE — 97161 PT EVAL LOW COMPLEX 20 MIN: CPT

## 2023-07-15 PROCEDURE — 84155 ASSAY OF PROTEIN SERUM: CPT

## 2023-07-15 PROCEDURE — 86317 IMMUNOASSAY INFECTIOUS AGENT: CPT

## 2023-07-15 PROCEDURE — 82784 ASSAY IGA/IGD/IGG/IGM EACH: CPT

## 2023-07-15 PROCEDURE — 80076 HEPATIC FUNCTION PANEL: CPT

## 2023-07-15 PROCEDURE — 84443 ASSAY THYROID STIM HORMONE: CPT

## 2023-07-15 PROCEDURE — 82746 ASSAY OF FOLIC ACID SERUM: CPT

## 2023-07-15 PROCEDURE — 6360000002 HC RX W HCPCS: Performed by: NURSE PRACTITIONER

## 2023-07-15 PROCEDURE — 6370000000 HC RX 637 (ALT 250 FOR IP): Performed by: NURSE PRACTITIONER

## 2023-07-15 PROCEDURE — 2060000000 HC ICU INTERMEDIATE R&B

## 2023-07-15 PROCEDURE — 82607 VITAMIN B-12: CPT

## 2023-07-15 PROCEDURE — 80074 ACUTE HEPATITIS PANEL: CPT

## 2023-07-15 PROCEDURE — 82728 ASSAY OF FERRITIN: CPT

## 2023-07-15 PROCEDURE — 80048 BASIC METABOLIC PNL TOTAL CA: CPT

## 2023-07-15 PROCEDURE — 83540 ASSAY OF IRON: CPT

## 2023-07-15 PROCEDURE — 86334 IMMUNOFIX E-PHORESIS SERUM: CPT

## 2023-07-15 PROCEDURE — 6370000000 HC RX 637 (ALT 250 FOR IP): Performed by: INTERNAL MEDICINE

## 2023-07-15 PROCEDURE — 84165 PROTEIN E-PHORESIS SERUM: CPT

## 2023-07-15 RX ADMIN — ENOXAPARIN SODIUM 40 MG: 100 INJECTION SUBCUTANEOUS at 08:35

## 2023-07-15 RX ADMIN — POTASSIUM CHLORIDE, DEXTROSE MONOHYDRATE AND SODIUM CHLORIDE: 150; 5; 900 INJECTION, SOLUTION INTRAVENOUS at 01:12

## 2023-07-15 RX ADMIN — SODIUM CHLORIDE, PRESERVATIVE FREE 10 ML: 5 INJECTION INTRAVENOUS at 20:52

## 2023-07-15 RX ADMIN — ACETAMINOPHEN 650 MG: 325 TABLET ORAL at 20:52

## 2023-07-15 RX ADMIN — POTASSIUM CHLORIDE 40 MEQ: 1500 TABLET, EXTENDED RELEASE ORAL at 08:36

## 2023-07-15 RX ADMIN — LOSARTAN POTASSIUM 50 MG: 50 TABLET, FILM COATED ORAL at 20:52

## 2023-07-15 RX ADMIN — PANTOPRAZOLE SODIUM 20 MG: 20 TABLET, DELAYED RELEASE ORAL at 08:35

## 2023-07-15 RX ADMIN — POTASSIUM CHLORIDE, DEXTROSE MONOHYDRATE AND SODIUM CHLORIDE: 150; 5; 900 INJECTION, SOLUTION INTRAVENOUS at 22:44

## 2023-07-15 RX ADMIN — ACETAMINOPHEN 650 MG: 325 TABLET ORAL at 08:35

## 2023-07-15 RX ADMIN — POTASSIUM CHLORIDE, DEXTROSE MONOHYDRATE AND SODIUM CHLORIDE: 150; 5; 900 INJECTION, SOLUTION INTRAVENOUS at 12:57

## 2023-07-15 ASSESSMENT — PAIN DESCRIPTION - FREQUENCY: FREQUENCY: CONTINUOUS

## 2023-07-15 ASSESSMENT — PAIN DESCRIPTION - ORIENTATION: ORIENTATION: RIGHT;LEFT

## 2023-07-15 ASSESSMENT — PAIN DESCRIPTION - ONSET: ONSET: ON-GOING

## 2023-07-15 ASSESSMENT — PAIN SCALES - GENERAL: PAINLEVEL_OUTOF10: 2

## 2023-07-15 ASSESSMENT — PAIN DESCRIPTION - LOCATION: LOCATION: GENERALIZED;SHOULDER;NECK

## 2023-07-15 ASSESSMENT — PAIN DESCRIPTION - PAIN TYPE: TYPE: CHRONIC PAIN

## 2023-07-15 ASSESSMENT — PAIN - FUNCTIONAL ASSESSMENT: PAIN_FUNCTIONAL_ASSESSMENT: PREVENTS OR INTERFERES SOME ACTIVE ACTIVITIES AND ADLS

## 2023-07-15 ASSESSMENT — PAIN DESCRIPTION - DESCRIPTORS: DESCRIPTORS: SORE

## 2023-07-16 ENCOUNTER — APPOINTMENT (OUTPATIENT)
Dept: ULTRASOUND IMAGING | Age: 88
DRG: 640 | End: 2023-07-16
Payer: MEDICARE

## 2023-07-16 ENCOUNTER — APPOINTMENT (OUTPATIENT)
Dept: GENERAL RADIOLOGY | Age: 88
DRG: 640 | End: 2023-07-16
Payer: MEDICARE

## 2023-07-16 LAB
ALBUMIN SERPL-MCNC: 3 G/DL (ref 3.5–5.2)
ALP SERPL-CCNC: 117 U/L (ref 35–104)
ALT SERPL-CCNC: 34 U/L (ref 0–32)
ANION GAP SERPL CALCULATED.3IONS-SCNC: 7 MMOL/L (ref 7–16)
AST SERPL-CCNC: 32 U/L (ref 0–31)
BILIRUB DIRECT SERPL-MCNC: <0.2 MG/DL (ref 0–0.3)
BILIRUB INDIRECT SERPL-MCNC: ABNORMAL MG/DL (ref 0–1)
BILIRUB SERPL-MCNC: 0.2 MG/DL (ref 0–1.2)
BNP SERPL-MCNC: 1724 PG/ML (ref 0–450)
BUN SERPL-MCNC: 11 MG/DL (ref 6–23)
CALCIUM SERPL-MCNC: 7.7 MG/DL (ref 8.6–10.2)
CHLORIDE SERPL-SCNC: 100 MMOL/L (ref 98–107)
CO2 SERPL-SCNC: 25 MMOL/L (ref 22–29)
CREAT SERPL-MCNC: 0.6 MG/DL (ref 0.5–1)
ERYTHROCYTE [DISTWIDTH] IN BLOOD BY AUTOMATED COUNT: 13.9 % (ref 11.5–15)
FERRITIN SERPL-MCNC: 349 NG/ML
FOLATE SERPL-MCNC: >20 NG/ML (ref 4.8–24.2)
GFR SERPL CREATININE-BSD FRML MDRD: >60 ML/MIN/1.73M2
GLUCOSE SERPL-MCNC: 126 MG/DL (ref 74–99)
HCT VFR BLD AUTO: 34.8 % (ref 34–48)
HGB BLD-MCNC: 11.5 G/DL (ref 11.5–15.5)
IGA SERPL-MCNC: 98 MG/DL (ref 70–400)
IGG SERPL-MCNC: 515 MG/DL (ref 700–1600)
IGM SERPL-MCNC: 49 MG/DL (ref 40–230)
INR PPP: 1
MCH RBC QN AUTO: 29.2 PG (ref 26–35)
MCHC RBC AUTO-ENTMCNC: 33 G/DL (ref 32–34.5)
MCV RBC AUTO: 88.3 FL (ref 80–99.9)
PLATELET, FLUORESCENCE: 133 K/UL (ref 130–450)
PMV BLD AUTO: 10.9 FL (ref 7–12)
POTASSIUM SERPL-SCNC: 4.8 MMOL/L (ref 3.5–5)
PROT SERPL-MCNC: 5 G/DL (ref 6.4–8.3)
PROTHROMBIN TIME: 11 SEC (ref 9.3–12.4)
RBC # BLD AUTO: 3.94 M/UL (ref 3.5–5.5)
SODIUM SERPL-SCNC: 132 MMOL/L (ref 132–146)
VIT B12 SERPL-MCNC: 698 PG/ML (ref 211–946)
WBC OTHER # BLD: 4.8 K/UL (ref 4.5–11.5)

## 2023-07-16 PROCEDURE — 80076 HEPATIC FUNCTION PANEL: CPT

## 2023-07-16 PROCEDURE — 2580000003 HC RX 258: Performed by: NURSE PRACTITIONER

## 2023-07-16 PROCEDURE — 6370000000 HC RX 637 (ALT 250 FOR IP): Performed by: NURSE PRACTITIONER

## 2023-07-16 PROCEDURE — 2500000003 HC RX 250 WO HCPCS: Performed by: INTERNAL MEDICINE

## 2023-07-16 PROCEDURE — 83880 ASSAY OF NATRIURETIC PEPTIDE: CPT

## 2023-07-16 PROCEDURE — 85610 PROTHROMBIN TIME: CPT

## 2023-07-16 PROCEDURE — 36415 COLL VENOUS BLD VENIPUNCTURE: CPT

## 2023-07-16 PROCEDURE — 76705 ECHO EXAM OF ABDOMEN: CPT

## 2023-07-16 PROCEDURE — 6360000002 HC RX W HCPCS: Performed by: NURSE PRACTITIONER

## 2023-07-16 PROCEDURE — 71046 X-RAY EXAM CHEST 2 VIEWS: CPT

## 2023-07-16 PROCEDURE — 80048 BASIC METABOLIC PNL TOTAL CA: CPT

## 2023-07-16 PROCEDURE — 85027 COMPLETE CBC AUTOMATED: CPT

## 2023-07-16 PROCEDURE — 93975 VASCULAR STUDY: CPT

## 2023-07-16 PROCEDURE — 2060000000 HC ICU INTERMEDIATE R&B

## 2023-07-16 RX ADMIN — SODIUM CHLORIDE, PRESERVATIVE FREE 10 ML: 5 INJECTION INTRAVENOUS at 09:05

## 2023-07-16 RX ADMIN — PANTOPRAZOLE SODIUM 20 MG: 20 TABLET, DELAYED RELEASE ORAL at 12:53

## 2023-07-16 RX ADMIN — ONDANSETRON 4 MG: 2 INJECTION INTRAMUSCULAR; INTRAVENOUS at 05:56

## 2023-07-16 RX ADMIN — SODIUM CHLORIDE, PRESERVATIVE FREE 10 ML: 5 INJECTION INTRAVENOUS at 05:58

## 2023-07-16 RX ADMIN — LOSARTAN POTASSIUM 50 MG: 50 TABLET, FILM COATED ORAL at 20:49

## 2023-07-16 RX ADMIN — POTASSIUM CHLORIDE, DEXTROSE MONOHYDRATE AND SODIUM CHLORIDE: 150; 5; 900 INJECTION, SOLUTION INTRAVENOUS at 09:09

## 2023-07-16 RX ADMIN — SODIUM CHLORIDE, PRESERVATIVE FREE 10 ML: 5 INJECTION INTRAVENOUS at 20:49

## 2023-07-16 RX ADMIN — ACETAMINOPHEN 650 MG: 325 TABLET ORAL at 20:49

## 2023-07-16 RX ADMIN — ACETAMINOPHEN 650 MG: 325 TABLET ORAL at 05:07

## 2023-07-16 RX ADMIN — ENOXAPARIN SODIUM 40 MG: 100 INJECTION SUBCUTANEOUS at 09:04

## 2023-07-16 ASSESSMENT — PAIN SCALES - GENERAL
PAINLEVEL_OUTOF10: 0
PAINLEVEL_OUTOF10: 0
PAINLEVEL_OUTOF10: 4
PAINLEVEL_OUTOF10: 3

## 2023-07-16 ASSESSMENT — PAIN DESCRIPTION - ONSET: ONSET: ON-GOING

## 2023-07-16 ASSESSMENT — PAIN DESCRIPTION - ORIENTATION: ORIENTATION: RIGHT;LEFT

## 2023-07-16 ASSESSMENT — PAIN DESCRIPTION - DESCRIPTORS: DESCRIPTORS: ACHING;SORE;TENDER

## 2023-07-16 ASSESSMENT — PAIN DESCRIPTION - LOCATION
LOCATION: BACK;SHOULDER
LOCATION: BACK

## 2023-07-16 ASSESSMENT — PAIN DESCRIPTION - FREQUENCY: FREQUENCY: CONTINUOUS

## 2023-07-16 ASSESSMENT — PAIN - FUNCTIONAL ASSESSMENT: PAIN_FUNCTIONAL_ASSESSMENT: ACTIVITIES ARE NOT PREVENTED

## 2023-07-16 ASSESSMENT — PAIN DESCRIPTION - PAIN TYPE: TYPE: CHRONIC PAIN

## 2023-07-17 PROBLEM — I50.31 ACUTE DIASTOLIC CONGESTIVE HEART FAILURE (HCC): Status: ACTIVE | Noted: 2023-07-17

## 2023-07-17 LAB
ALBUMIN SERPL-MCNC: 2.8 G/DL (ref 3.5–5.2)
ALP SERPL-CCNC: 113 U/L (ref 35–104)
ALT SERPL-CCNC: 31 U/L (ref 0–32)
ANION GAP SERPL CALCULATED.3IONS-SCNC: 9 MMOL/L (ref 7–16)
AST SERPL-CCNC: 36 U/L (ref 0–31)
BASOPHILS # BLD: 0.01 K/UL (ref 0–0.2)
BASOPHILS NFR BLD: 0 % (ref 0–2)
BILIRUB DIRECT SERPL-MCNC: <0.2 MG/DL (ref 0–0.3)
BILIRUB INDIRECT SERPL-MCNC: ABNORMAL MG/DL (ref 0–1)
BILIRUB SERPL-MCNC: 0.3 MG/DL (ref 0–1.2)
BUN SERPL-MCNC: 7 MG/DL (ref 6–23)
CALCIUM SERPL-MCNC: 7.8 MG/DL (ref 8.6–10.2)
CHLORIDE SERPL-SCNC: 99 MMOL/L (ref 98–107)
CO2 SERPL-SCNC: 23 MMOL/L (ref 22–29)
CREAT SERPL-MCNC: 0.5 MG/DL (ref 0.5–1)
EOSINOPHIL # BLD: 0.15 K/UL (ref 0.05–0.5)
EOSINOPHILS RELATIVE PERCENT: 4 % (ref 0–6)
ERYTHROCYTE [DISTWIDTH] IN BLOOD BY AUTOMATED COUNT: 13.9 % (ref 11.5–15)
GFR SERPL CREATININE-BSD FRML MDRD: >60 ML/MIN/1.73M2
GLUCOSE SERPL-MCNC: 96 MG/DL (ref 74–99)
HAV IGM SERPL QL IA: NONREACTIVE
HBV CORE IGM SERPL QL IA: NONREACTIVE
HBV SURFACE AB SERPL IA-ACNC: 34.16 MIU/ML (ref 0–9.99)
HBV SURFACE AG SERPL QL IA: NONREACTIVE
HCT VFR BLD AUTO: 33.5 % (ref 34–48)
HCV AB SERPL QL IA: NONREACTIVE
HGB BLD-MCNC: 11.3 G/DL (ref 11.5–15.5)
IMM GRANULOCYTES # BLD AUTO: <0.03 K/UL (ref 0–0.58)
IMM GRANULOCYTES NFR BLD: 1 % (ref 0–5)
LYMPHOCYTES # BLD: 36 % (ref 20–42)
LYMPHOCYTES NFR BLD: 1.34 K/UL (ref 1.5–4)
MCH RBC QN AUTO: 29.7 PG (ref 26–35)
MCHC RBC AUTO-ENTMCNC: 33.7 G/DL (ref 32–34.5)
MCV RBC AUTO: 87.9 FL (ref 80–99.9)
MONOCYTES NFR BLD: 0.29 K/UL (ref 0.1–0.95)
MONOCYTES NFR BLD: 8 % (ref 2–12)
NEUTROPHILS NFR BLD: 52 % (ref 43–80)
NEUTS SEG NFR BLD: 1.95 K/UL (ref 1.8–7.3)
PLATELET, FLUORESCENCE: 134 K/UL (ref 130–450)
PMV BLD AUTO: 10.9 FL (ref 7–12)
POTASSIUM SERPL-SCNC: 4.8 MMOL/L (ref 3.5–5)
PROT SERPL-MCNC: 5.4 G/DL (ref 6.4–8.3)
RBC # BLD AUTO: 3.81 M/UL (ref 3.5–5.5)
SODIUM SERPL-SCNC: 131 MMOL/L (ref 132–146)
WBC OTHER # BLD: 3.8 K/UL (ref 4.5–11.5)

## 2023-07-17 PROCEDURE — 80076 HEPATIC FUNCTION PANEL: CPT

## 2023-07-17 PROCEDURE — 2580000003 HC RX 258: Performed by: NURSE PRACTITIONER

## 2023-07-17 PROCEDURE — 99222 1ST HOSP IP/OBS MODERATE 55: CPT | Performed by: INTERNAL MEDICINE

## 2023-07-17 PROCEDURE — 2060000000 HC ICU INTERMEDIATE R&B

## 2023-07-17 PROCEDURE — 6360000002 HC RX W HCPCS: Performed by: NURSE PRACTITIONER

## 2023-07-17 PROCEDURE — APPSS60 APP SPLIT SHARED TIME 46-60 MINUTES

## 2023-07-17 PROCEDURE — 6370000000 HC RX 637 (ALT 250 FOR IP): Performed by: NURSE PRACTITIONER

## 2023-07-17 PROCEDURE — 6360000002 HC RX W HCPCS

## 2023-07-17 PROCEDURE — 80048 BASIC METABOLIC PNL TOTAL CA: CPT

## 2023-07-17 PROCEDURE — 93005 ELECTROCARDIOGRAM TRACING: CPT

## 2023-07-17 PROCEDURE — 85027 COMPLETE CBC AUTOMATED: CPT

## 2023-07-17 RX ORDER — FUROSEMIDE 10 MG/ML
40 INJECTION INTRAMUSCULAR; INTRAVENOUS ONCE
Status: COMPLETED | OUTPATIENT
Start: 2023-07-17 | End: 2023-07-17

## 2023-07-17 RX ADMIN — PANTOPRAZOLE SODIUM 20 MG: 20 TABLET, DELAYED RELEASE ORAL at 09:22

## 2023-07-17 RX ADMIN — SODIUM CHLORIDE, PRESERVATIVE FREE 10 ML: 5 INJECTION INTRAVENOUS at 20:27

## 2023-07-17 RX ADMIN — FUROSEMIDE 40 MG: 10 INJECTION, SOLUTION INTRAMUSCULAR; INTRAVENOUS at 11:18

## 2023-07-17 RX ADMIN — SODIUM CHLORIDE, PRESERVATIVE FREE 10 ML: 5 INJECTION INTRAVENOUS at 09:22

## 2023-07-17 RX ADMIN — LOSARTAN POTASSIUM 50 MG: 50 TABLET, FILM COATED ORAL at 20:27

## 2023-07-17 RX ADMIN — ENOXAPARIN SODIUM 40 MG: 100 INJECTION SUBCUTANEOUS at 09:22

## 2023-07-17 NOTE — CONSULTS
07/16/23 07:00   Pro-BNP 0 - 450 pg/mL 1,724 (H)   (H): Data is abnormally high    CT abd:  Hiatal hernia. Tiny bubble of air in the urinary bladder. Diverticulosis without evidence of acute diverticulitis. Free fluid in the pelvis. CXR 7/13:  Hiatal hernia. No acute cardiopulmonary disease. CXR 7/16:  1. Trace bilateral pleural effusions/thickening. 2. Hyperinflation and chronic interstitial thickening. US liver/ retro:  1. Mild lobular surface to the liver without focal abnormality other than a  few scattered calcified granulomata. 2.  Right pleural effusion. 3.  Mild ascites. Normal hepatopetal flow associated with the portal veins. For additional  information, please refer to ultrasound of liver performed on the same date. Assessment:  Iatrogenic hypervolemia versus new HF. Patient reports shortness of breath yesterday with resolution today. Patient received total documented fluid of 3.6 L since admission. IVF was stopped yesterday at 1530. New trace bilateral pleural effusions on chest x-ray yesterday when compared with chest x-ray on admission. NSVT, 3 beats earlier this morning. Systolic murmur  Hyponatremia, 131 today  Malnutrition, albumin 2.8/total protein 5.4  Leukocytosis/thrombocytopenia. Being followed by hematology  Elevated LFTs with ascites. Being followed by GI  Hypertension, labile. Currently elevated  Hyperlipidemia. Zocor held at this time with elevated LFTs  History of nonmelanoma skin CA  GERD  Arthritis  Lifetime non-smoker    Plan:  Echocardiogram.  Lasix IV 40 mg once today. Consider p.o. Lasix tomorrow pending patient response and sodium. Continue losartan. Strict intake and output, daily weights. Monitor renal function closely. Continue telemetry, monitor for arrhythmias  Monitor electrolytes: Keep potassium >4.0 and magnesium >2.0. Rest per primary service and other consultants. Case and subsequent orders discussed with Dr Lorenso Spurling.

## 2023-07-17 NOTE — PLAN OF CARE
Problem: Discharge Planning  Goal: Discharge to home or other facility with appropriate resources  7/16/2023 2359 by Barbi Gunn RN  Outcome: Progressing  7/16/2023 1049 by Hilda Magallanes RN  Outcome: Progressing     Problem: Safety - Adult  Goal: Free from fall injury  7/16/2023 2359 by Barbi Gunn RN  Outcome: Progressing  7/16/2023 1049 by Hilda Magallanes RN  Outcome: Progressing     Problem: Pain  Goal: Verbalizes/displays adequate comfort level or baseline comfort level  7/16/2023 2359 by Barbi Gunn RN  Outcome: Progressing  7/16/2023 1049 by Hlida Magallanes RN  Outcome: Progressing

## 2023-07-18 VITALS
TEMPERATURE: 98.1 F | SYSTOLIC BLOOD PRESSURE: 175 MMHG | WEIGHT: 145.94 LBS | DIASTOLIC BLOOD PRESSURE: 72 MMHG | OXYGEN SATURATION: 97 % | RESPIRATION RATE: 18 BRPM | HEIGHT: 60 IN | BODY MASS INDEX: 28.65 KG/M2 | HEART RATE: 70 BPM

## 2023-07-18 LAB
ALBUMIN SERPL-MCNC: 2.3 G/DL (ref 3.5–4.7)
ALBUMIN SERPL-MCNC: 3.1 G/DL (ref 3.5–5.2)
ALP SERPL-CCNC: 110 U/L (ref 35–104)
ALPHA1 GLOB SERPL ELPH-MCNC: 0.3 G/DL (ref 0.2–0.4)
ALPHA2 GLOB SERPL ELPH-MCNC: 0.8 G/DL (ref 0.5–1)
ALT SERPL-CCNC: 33 U/L (ref 0–32)
ANION GAP SERPL CALCULATED.3IONS-SCNC: 9 MMOL/L (ref 7–16)
AST SERPL-CCNC: 32 U/L (ref 0–31)
B-GLOBULIN SERPL ELPH-MCNC: 1 G/DL (ref 0.8–1.3)
BILIRUB DIRECT SERPL-MCNC: <0.2 MG/DL (ref 0–0.3)
BILIRUB INDIRECT SERPL-MCNC: ABNORMAL MG/DL (ref 0–1)
BILIRUB SERPL-MCNC: 0.3 MG/DL (ref 0–1.2)
BUN SERPL-MCNC: 7 MG/DL (ref 6–23)
CALCIUM SERPL-MCNC: 8.2 MG/DL (ref 8.6–10.2)
CHLORIDE SERPL-SCNC: 101 MMOL/L (ref 98–107)
CO2 SERPL-SCNC: 27 MMOL/L (ref 22–29)
CREAT SERPL-MCNC: 0.5 MG/DL (ref 0.5–1)
EKG ATRIAL RATE: 60 BPM
EKG P AXIS: 48 DEGREES
EKG P-R INTERVAL: 172 MS
EKG Q-T INTERVAL: 416 MS
EKG QRS DURATION: 78 MS
EKG QTC CALCULATION (BAZETT): 416 MS
EKG R AXIS: 16 DEGREES
EKG T AXIS: 18 DEGREES
EKG VENTRICULAR RATE: 60 BPM
GAMMA GLOB SERPL ELPH-MCNC: 0.8 G/DL (ref 0.7–1.6)
GFR SERPL CREATININE-BSD FRML MDRD: >60 ML/MIN/1.73M2
GLUCOSE SERPL-MCNC: 103 MG/DL (ref 74–99)
INTERPRETATION SERPL IFE-IMP: NORMAL
LV EF: 68 %
LVEF MODALITY: NORMAL
MAGNESIUM SERPL-MCNC: 2.1 MG/DL (ref 1.6–2.6)
PATH REV: NORMAL
PATHOLOGIST: ABNORMAL
POTASSIUM SERPL-SCNC: 3.6 MMOL/L (ref 3.5–5)
PROT PATTERN SERPL ELPH-IMP: ABNORMAL
PROT SERPL-MCNC: 5.3 G/DL (ref 6.4–8.3)
PROT SERPL-MCNC: 5.4 G/DL (ref 6.4–8.3)
SODIUM SERPL-SCNC: 137 MMOL/L (ref 132–146)

## 2023-07-18 PROCEDURE — 80048 BASIC METABOLIC PNL TOTAL CA: CPT

## 2023-07-18 PROCEDURE — 36415 COLL VENOUS BLD VENIPUNCTURE: CPT

## 2023-07-18 PROCEDURE — 99232 SBSQ HOSP IP/OBS MODERATE 35: CPT | Performed by: INTERNAL MEDICINE

## 2023-07-18 PROCEDURE — 6370000000 HC RX 637 (ALT 250 FOR IP): Performed by: NURSE PRACTITIONER

## 2023-07-18 PROCEDURE — 2580000003 HC RX 258: Performed by: NURSE PRACTITIONER

## 2023-07-18 PROCEDURE — 83735 ASSAY OF MAGNESIUM: CPT

## 2023-07-18 PROCEDURE — 6360000002 HC RX W HCPCS: Performed by: NURSE PRACTITIONER

## 2023-07-18 PROCEDURE — 93010 ELECTROCARDIOGRAM REPORT: CPT | Performed by: INTERNAL MEDICINE

## 2023-07-18 PROCEDURE — 80076 HEPATIC FUNCTION PANEL: CPT

## 2023-07-18 PROCEDURE — 93306 TTE W/DOPPLER COMPLETE: CPT

## 2023-07-18 RX ORDER — FUROSEMIDE 40 MG/1
40 TABLET ORAL DAILY
Qty: 60 TABLET | Refills: 3 | Status: SHIPPED | OUTPATIENT
Start: 2023-07-18

## 2023-07-18 RX ADMIN — PANTOPRAZOLE SODIUM 20 MG: 20 TABLET, DELAYED RELEASE ORAL at 09:31

## 2023-07-18 RX ADMIN — SODIUM CHLORIDE, PRESERVATIVE FREE 10 ML: 5 INJECTION INTRAVENOUS at 08:50

## 2023-07-18 RX ADMIN — ENOXAPARIN SODIUM 40 MG: 100 INJECTION SUBCUTANEOUS at 08:49

## 2023-07-18 NOTE — PLAN OF CARE
Problem: Discharge Planning  Goal: Discharge to home or other facility with appropriate resources  7/18/2023 1204 by Akhil Laruen RN  Outcome: Adequate for Discharge  7/18/2023 1009 by Akhil Lauren RN  Outcome: Progressing  7/17/2023 2206 by Patito Kirk RN  Outcome: Progressing     Problem: Safety - Adult  Goal: Free from fall injury  7/18/2023 1204 by Akhil Lauren RN  Outcome: Adequate for Discharge  7/18/2023 1009 by Akhil Lauren RN  Outcome: Progressing  7/17/2023 2206 by Patito Kirk RN  Outcome: Progressing     Problem: Pain  Goal: Verbalizes/displays adequate comfort level or baseline comfort level  7/18/2023 1204 by Akhil Lauren RN  Outcome: Adequate for Discharge  7/18/2023 1009 by Akhil Lauren RN  Outcome: Progressing  7/17/2023 2206 by Patito Kirk RN  Outcome: Progressing     Problem: Chronic Conditions and Co-morbidities  Goal: Patient's chronic conditions and co-morbidity symptoms are monitored and maintained or improved  Outcome: Adequate for Discharge

## 2023-07-18 NOTE — PLAN OF CARE
Problem: Discharge Planning  Goal: Discharge to home or other facility with appropriate resources  7/18/2023 1009 by Behzad Marie RN  Outcome: Progressing  7/17/2023 2206 by Kristal Stewart RN  Outcome: Progressing     Problem: Safety - Adult  Goal: Free from fall injury  7/18/2023 1009 by Behzad Marie RN  Outcome: Progressing  7/17/2023 2206 by Kristal Stewart RN  Outcome: Progressing     Problem: Pain  Goal: Verbalizes/displays adequate comfort level or baseline comfort level  7/18/2023 1009 by Behzad Marie RN  Outcome: Progressing  7/17/2023 2206 by Kristal Stewart RN  Outcome: Progressing

## 2023-07-18 NOTE — PLAN OF CARE
Problem: Discharge Planning  Goal: Discharge to home or other facility with appropriate resources  7/18/2023 1226 by Archibald Blizzard, RN  Outcome: Adequate for Discharge  7/18/2023 1204 by Archibald Blizzard, RN  Outcome: Adequate for Discharge  7/18/2023 1009 by Archibald Blizzard, RN  Outcome: Progressing     Problem: Safety - Adult  Goal: Free from fall injury  7/18/2023 1226 by Archibald Blizzard, RN  Outcome: Adequate for Discharge  7/18/2023 1204 by Archibald Blizzard, RN  Outcome: Adequate for Discharge  7/18/2023 1009 by Archibald Blizzard, RN  Outcome: Progressing     Problem: Pain  Goal: Verbalizes/displays adequate comfort level or baseline comfort level  7/18/2023 1226 by Archibald Blizzard, RN  Outcome: Adequate for Discharge  7/18/2023 1204 by Archibald Blizzard, RN  Outcome: Adequate for Discharge  7/18/2023 1009 by Archibald Blizzard, RN  Outcome: Progressing     Problem: Chronic Conditions and Co-morbidities  Goal: Patient's chronic conditions and co-morbidity symptoms are monitored and maintained or improved  7/18/2023 1226 by Archibald Blizzard, RN  Outcome: Adequate for Discharge  7/18/2023 1204 by Archibald Blizzard, RN  Outcome: Adequate for Discharge

## 2023-07-21 ENCOUNTER — HOSPITAL ENCOUNTER (OUTPATIENT)
Age: 88
End: 2023-07-21
Payer: MEDICARE

## 2023-07-21 ENCOUNTER — HOSPITAL ENCOUNTER (OUTPATIENT)
Dept: GENERAL RADIOLOGY | Age: 88
End: 2023-07-21
Payer: MEDICARE

## 2023-07-21 DIAGNOSIS — J91.8 PLEURAL EFFUSION IN OTHER CONDITIONS CLASSIFIED ELSEWHERE: ICD-10-CM

## 2023-07-21 PROCEDURE — 71046 X-RAY EXAM CHEST 2 VIEWS: CPT

## 2024-03-18 DIAGNOSIS — M81.0 AGE-RELATED OSTEOPOROSIS WITHOUT CURRENT PATHOLOGICAL FRACTURE: Primary | ICD-10-CM

## 2024-04-08 ENCOUNTER — HOSPITAL ENCOUNTER (OUTPATIENT)
Dept: INFUSION THERAPY | Age: 89
Setting detail: INFUSION SERIES
Discharge: HOME | End: 2024-04-08
Payer: MEDICARE

## 2024-04-08 VITALS
SYSTOLIC BLOOD PRESSURE: 163 MMHG | DIASTOLIC BLOOD PRESSURE: 77 MMHG | TEMPERATURE: 97.8 F | HEART RATE: 86 BPM | HEIGHT: 60 IN | BODY MASS INDEX: 25.13 KG/M2 | OXYGEN SATURATION: 99 % | WEIGHT: 128 LBS | RESPIRATION RATE: 16 BRPM

## 2024-04-08 DIAGNOSIS — M81.0 AGE-RELATED OSTEOPOROSIS WITHOUT CURRENT PATHOLOGICAL FRACTURE: Primary | ICD-10-CM

## 2024-04-08 PROCEDURE — 6360000002 HC RX W HCPCS: Performed by: FAMILY MEDICINE

## 2024-04-08 PROCEDURE — 96372 THER/PROPH/DIAG INJ SC/IM: CPT

## 2024-04-08 RX ADMIN — DENOSUMAB 60 MG: 60 INJECTION SUBCUTANEOUS at 09:19

## 2024-10-14 ENCOUNTER — HOSPITAL ENCOUNTER (OUTPATIENT)
Dept: INFUSION THERAPY | Age: 89
Setting detail: INFUSION SERIES
Discharge: HOME | End: 2024-10-14
Payer: MEDICARE

## 2024-10-14 VITALS
HEART RATE: 64 BPM | SYSTOLIC BLOOD PRESSURE: 160 MMHG | OXYGEN SATURATION: 95 % | RESPIRATION RATE: 16 BRPM | DIASTOLIC BLOOD PRESSURE: 68 MMHG | TEMPERATURE: 97.4 F

## 2024-10-14 DIAGNOSIS — M81.0 AGE-RELATED OSTEOPOROSIS WITHOUT CURRENT PATHOLOGICAL FRACTURE: Primary | ICD-10-CM

## 2024-10-14 PROCEDURE — 6360000002 HC RX W HCPCS: Performed by: FAMILY MEDICINE

## 2024-10-14 PROCEDURE — 96372 THER/PROPH/DIAG INJ SC/IM: CPT

## 2024-10-14 RX ADMIN — DENOSUMAB 60 MG: 60 INJECTION SUBCUTANEOUS at 14:31

## 2025-04-07 NOTE — PROGRESS NOTES
Called to Dr. Dennison's office February is the most recent labs spoke with Vianey she will check with doctor if OK to use if not she will have the patient come in for a redraw

## 2025-04-14 ENCOUNTER — HOSPITAL ENCOUNTER (OUTPATIENT)
Dept: INFUSION THERAPY | Age: 89
Setting detail: INFUSION SERIES
Discharge: HOME | End: 2025-04-14
Payer: MEDICARE

## 2025-04-14 VITALS
TEMPERATURE: 97.7 F | HEART RATE: 85 BPM | OXYGEN SATURATION: 99 % | HEIGHT: 60 IN | RESPIRATION RATE: 16 BRPM | SYSTOLIC BLOOD PRESSURE: 141 MMHG | WEIGHT: 130 LBS | BODY MASS INDEX: 25.52 KG/M2 | DIASTOLIC BLOOD PRESSURE: 55 MMHG

## 2025-04-14 DIAGNOSIS — M81.0 AGE-RELATED OSTEOPOROSIS WITHOUT CURRENT PATHOLOGICAL FRACTURE: Primary | ICD-10-CM

## 2025-04-14 PROCEDURE — 96372 THER/PROPH/DIAG INJ SC/IM: CPT

## 2025-04-14 PROCEDURE — 6360000002 HC RX W HCPCS: Performed by: FAMILY MEDICINE

## 2025-04-14 RX ADMIN — DENOSUMAB 60 MG: 60 INJECTION SUBCUTANEOUS at 14:13
